# Patient Record
Sex: MALE | Race: ASIAN | NOT HISPANIC OR LATINO | Employment: FULL TIME | URBAN - METROPOLITAN AREA
[De-identification: names, ages, dates, MRNs, and addresses within clinical notes are randomized per-mention and may not be internally consistent; named-entity substitution may affect disease eponyms.]

---

## 2017-01-11 ENCOUNTER — OFFICE VISIT (OUTPATIENT)
Dept: PSYCHIATRY | Facility: HOSPITAL | Age: 37
End: 2017-01-11

## 2017-01-11 DIAGNOSIS — F43.23 ADJUSTMENT DISORDER WITH MIXED ANXIETY AND DEPRESSED MOOD: Primary | ICD-10-CM

## 2017-01-11 PROCEDURE — 90834 PSYTX W PT 45 MINUTES: CPT | Performed by: SOCIAL WORKER

## 2017-01-11 NOTE — PROGRESS NOTES
Met with client for session from 10:30-11:15. Reviewed and signed treatment plan. Still feeling very positive about the changes he has made in the marriage and in parenting. Spoke about his mother's visit the past week and that he really enjoyed having her around. Spoke about some difficult incidents in parenting and changes that he is wanting to work toward. Discussed being easy on himself and understanding that he is doing the best he can do. Will see in two weeks.

## 2017-01-25 ENCOUNTER — OFFICE VISIT (OUTPATIENT)
Dept: PSYCHIATRY | Facility: HOSPITAL | Age: 37
End: 2017-01-25

## 2017-01-25 DIAGNOSIS — F43.23 ADJUSTMENT DISORDER WITH MIXED ANXIETY AND DEPRESSED MOOD: Primary | ICD-10-CM

## 2017-01-25 PROCEDURE — 90834 PSYTX W PT 45 MINUTES: CPT | Performed by: SOCIAL WORKER

## 2017-01-25 NOTE — PROGRESS NOTES
Met with client for session from 10:30-11:15. Spoke about the past two weeks and reports that there have been ups and downs. Still working on bettering his marriage and the things he does day to day. Is doing well coming up with solutions to issues rather than just presenting problems to his wife. Seems to be much more comfortable bringing up issues with his wife and attempting to work together and compromise. She is out of town a lot the next two weeks and the client is spending a lot of alone time with his son. Says this is going well and he seems to be enjoying this time. Will see in two weeks.

## 2017-02-08 ENCOUNTER — OFFICE VISIT (OUTPATIENT)
Dept: PSYCHIATRY | Facility: HOSPITAL | Age: 37
End: 2017-02-08

## 2017-02-08 DIAGNOSIS — F43.23 ADJUSTMENT DISORDER WITH MIXED ANXIETY AND DEPRESSED MOOD: Primary | ICD-10-CM

## 2017-02-08 PROCEDURE — 90834 PSYTX W PT 45 MINUTES: CPT | Performed by: SOCIAL WORKER

## 2017-02-08 NOTE — PROGRESS NOTES
Met with client for session from 2:00-2:45. Spoke about the past two weeks and some stressors that came up. Jeanie has been travelling for work some so he is doing a lot of parenting by himself and has a learned a lot about choosing battles and where to pinpoint his energy. He and Jeanie are still doing well with communication and in therapy. Still working on money and budgeting. Had some issues with the dog costing money and are talking about what to do with the tax refund. Will see in two weeks.

## 2017-02-22 ENCOUNTER — OFFICE VISIT (OUTPATIENT)
Dept: PSYCHIATRY | Facility: HOSPITAL | Age: 37
End: 2017-02-22

## 2017-02-22 DIAGNOSIS — F43.23 ADJUSTMENT DISORDER WITH MIXED ANXIETY AND DEPRESSED MOOD: Primary | ICD-10-CM

## 2017-02-22 PROCEDURE — 90834 PSYTX W PT 45 MINUTES: CPT | Performed by: SOCIAL WORKER

## 2017-02-22 NOTE — PROGRESS NOTES
Met with client for session from 8:00-8:45. Reported that the positive changes he has made in his marriage still continue and he is satisfied with his progress. Brought up some concerns he is having with his wife and some decisions she is making. Spoke about ways to talk about this and explored what some underlying issues are related to this issue. Encouraged him to communicate the concerns and the impact they are having on him and the family. Agreed to do so and will see in 3 weeks.

## 2017-03-15 ENCOUNTER — OFFICE VISIT (OUTPATIENT)
Dept: PSYCHIATRY | Facility: HOSPITAL | Age: 37
End: 2017-03-15

## 2017-03-15 DIAGNOSIS — F43.23 ADJUSTMENT DISORDER WITH MIXED ANXIETY AND DEPRESSED MOOD: Primary | ICD-10-CM

## 2017-03-15 PROCEDURE — 90834 PSYTX W PT 45 MINUTES: CPT | Performed by: SOCIAL WORKER

## 2017-03-15 NOTE — PROGRESS NOTES
Met with client for session from 3:00-3:53. Spoke about the past three weeks and reported that things continue to go really well in the marriage and with his goals. Reported that there is still some conflict with his wife over issues and the they cannot seem to work them out. Is worried that this will not be worked out but will still talk about it in couples therapy. Agreed to continue to work on communication. Both are focused on weight watchers and thinks this is a good thing to focus on at this time. Will see in five weeks.

## 2017-04-12 ENCOUNTER — APPOINTMENT (OUTPATIENT)
Dept: PSYCHIATRY | Facility: HOSPITAL | Age: 37
End: 2017-04-12

## 2017-04-12 ENCOUNTER — OFFICE VISIT (OUTPATIENT)
Dept: PSYCHIATRY | Facility: HOSPITAL | Age: 37
End: 2017-04-12

## 2017-04-12 DIAGNOSIS — F43.23 ADJUSTMENT DISORDER WITH MIXED ANXIETY AND DEPRESSED MOOD: Primary | ICD-10-CM

## 2017-04-12 PROCEDURE — 90834 PSYTX W PT 45 MINUTES: CPT | Performed by: SOCIAL WORKER

## 2017-04-12 NOTE — PROGRESS NOTES
Met with client for session from 3:00-3:45. Spoke about his trip for the anniversary and it did not go as well as he hoped it would. Is frustrated with his wife but also acknowledges some acceptance in terms of the relationship and that he needs to deal with these intense feelings. Is also worried about parenting and his frustration there. Provided a lot of normalization in terms of Oziel's age and accepted behaviors. Understood and will return next month.

## 2017-05-10 ENCOUNTER — OFFICE VISIT (OUTPATIENT)
Dept: PSYCHIATRY | Facility: HOSPITAL | Age: 37
End: 2017-05-10

## 2017-05-10 DIAGNOSIS — F43.23 ADJUSTMENT DISORDER WITH MIXED ANXIETY AND DEPRESSED MOOD: Primary | ICD-10-CM

## 2017-05-10 PROCEDURE — 90834 PSYTX W PT 45 MINUTES: CPT | Performed by: SOCIAL WORKER

## 2017-06-07 ENCOUNTER — OFFICE VISIT (OUTPATIENT)
Dept: PSYCHIATRY | Facility: HOSPITAL | Age: 37
End: 2017-06-07

## 2017-06-07 DIAGNOSIS — F43.23 ADJUSTMENT DISORDER WITH MIXED ANXIETY AND DEPRESSED MOOD: Primary | ICD-10-CM

## 2017-06-07 PROCEDURE — 90834 PSYTX W PT 45 MINUTES: CPT | Performed by: SOCIAL WORKER

## 2017-06-07 NOTE — PROGRESS NOTES
Met with client for session from 3:00-3:45. Spoke about the past few weeks and had a stretch of time where his anxiety was increased. Definitely recognizes a difference in his ability to manage stress changes based on how he is treating his body. His wife is pregnant and we discussed his reaction to it. Acknowledges that his exictement level is very different than that of his wife. Will continue on with therapy via [facility].

## 2017-10-23 ENCOUNTER — OFFICE VISIT (OUTPATIENT)
Dept: FAMILY MEDICINE CLINIC | Facility: CLINIC | Age: 37
End: 2017-10-23

## 2017-10-23 VITALS
HEIGHT: 67 IN | TEMPERATURE: 97.5 F | HEART RATE: 62 BPM | WEIGHT: 169.2 LBS | BODY MASS INDEX: 26.56 KG/M2 | OXYGEN SATURATION: 98 % | DIASTOLIC BLOOD PRESSURE: 62 MMHG | SYSTOLIC BLOOD PRESSURE: 118 MMHG

## 2017-10-23 DIAGNOSIS — J40 BRONCHITIS: Primary | ICD-10-CM

## 2017-10-23 PROCEDURE — 99213 OFFICE O/P EST LOW 20 MIN: CPT | Performed by: FAMILY MEDICINE

## 2017-10-23 NOTE — PROGRESS NOTES
"Genesis Fernandez is a 37 y.o. male.     Chief Complaint   Patient presents with   • Cough     x 5 wks, not better was seen at the [facility] and still has a cough        History of Present Illness    Cough.  5 weeks.  Started with some cold symptoms.  Wife and son a been sick.  He went to [place] last week.  Was able to exercise mostly.  The cough is getting better.  Nonproductive.  Dry.  No wheezing.  He did have some trouble exercising couple weeks ago because of the coughing.  He went to a urgent care center.  Was given a prescription for a Z-Gaetano.  Seen by a nurse practitioner.  Otherwise doing well today.  No fever.  Does not smoke tobacco.      The following portions of the patient's history were reviewed and updated as appropriate: allergies, current medications, past family history, past medical history, past social history, past surgical history and problem list.          Review of Systems   Constitutional: Negative for fever.   HENT: Negative.    Respiratory: Positive for cough. Negative for shortness of breath and wheezing.    Cardiovascular: Negative.    Gastrointestinal: Negative.        Objective   Blood pressure 118/62, pulse 62, temperature 97.5 °F (36.4 °C), temperature source Oral, height 67\" (170.2 cm), weight 169 lb 3.2 oz (76.7 kg), SpO2 98 %.  Physical Exam   Constitutional: No distress.   No acute distress.  Nontoxic.   HENT:   Right Ear: Tympanic membrane, external ear and ear canal normal.   Left Ear: Tympanic membrane, external ear and ear canal normal.   Nose: Nose normal.   Mouth/Throat: Oropharynx is clear and moist. No oropharyngeal exudate.   Eyes: Conjunctivae are normal. Right eye exhibits no discharge. Left eye exhibits no discharge. No scleral icterus.   Cardiovascular: Normal rate.    Pulmonary/Chest: Effort normal and breath sounds normal. No stridor. No respiratory distress. He has no wheezes. He has no rales.   No tachypnea   Lymphadenopathy:     He has no cervical " adenopathy.   Skin: No rash noted.   Nursing note and vitals reviewed.      Assessment/Plan   Johnson was seen today for cough.    Diagnoses and all orders for this visit:    Bronchitis       Viral bronchitis.  Resolving.  At this time I recommend observation.  Continue over-the-counter cough drops.  If not improved in 2 weeks would recommend chest x-ray.  He will call if not improved.

## 2018-09-07 ENCOUNTER — OFFICE VISIT (OUTPATIENT)
Dept: FAMILY MEDICINE CLINIC | Facility: CLINIC | Age: 38
End: 2018-09-07

## 2018-09-07 VITALS
DIASTOLIC BLOOD PRESSURE: 76 MMHG | TEMPERATURE: 97.1 F | BODY MASS INDEX: 26.38 KG/M2 | OXYGEN SATURATION: 99 % | WEIGHT: 168.1 LBS | SYSTOLIC BLOOD PRESSURE: 125 MMHG | HEART RATE: 66 BPM | HEIGHT: 67 IN

## 2018-09-07 DIAGNOSIS — S62.639D CLOSED FRACTURE OF TUFT OF DISTAL PHALANX OF FINGER WITH ROUTINE HEALING: Primary | ICD-10-CM

## 2018-09-07 PROCEDURE — 99213 OFFICE O/P EST LOW 20 MIN: CPT | Performed by: FAMILY MEDICINE

## 2018-09-07 PROCEDURE — 73140 X-RAY EXAM OF FINGER(S): CPT | Performed by: FAMILY MEDICINE

## 2018-09-07 NOTE — PROGRESS NOTES
"Genesis Fernandez is a 38 y.o. male.     Chief Complaint   Patient presents with   • Finger Injury     follow up broken left  4th digit on aug 5th after dropping a weight on it at the gym        History of Present Illness    One-month follow-up.  He was at the urgent care center just over 4 weeks ago.  Left fourth finger tuft fracture.  He dropped some weights on it.  He's had it immobilized since.  He complains of minimal discomfort.  He's able to move the finger now.  The bruising is gone.  No numbness.  No weakness.  He had severe pain then, but now the pain is all but gone.      The following portions of the patient's history were reviewed and updated as appropriate: allergies, current medications, past family history, past medical history, past social history, past surgical history and problem list.          Review of Systems   Constitutional: Negative.    Musculoskeletal: Negative for arthralgias and joint swelling.   Skin: Negative for rash and wound.   Neurological: Negative for weakness and numbness.       Objective   Blood pressure 125/76, pulse 66, temperature 97.1 °F (36.2 °C), temperature source Oral, height 170.2 cm (67.01\"), weight 76.2 kg (168 lb 1.6 oz), SpO2 99 %.  Physical Exam   Constitutional: No distress.   Musculoskeletal:   Examination left fourth finger reveals full range of motion of all joints.  There is minimal ecchymosis under the distal nail.  He has intact neurovascular tendon status.  In particular he can maintain active DIP extension against resistance, similar with flexion.  No significant pain to palpation.   Skin: He is not diaphoretic.       X-ray left fourth finger.  Indication follow-up.  Compared to previous films one month ago.  I reviewed the old films.  It demonstrated a Tuft fracture longitudinally.  Only seen on AP view.  X-ray today reveal a comminuted tuft fracture that appears to be healing.  There is questionable malunion.  Final report " pending.      Assessment/Plan   Johnson was seen today for finger injury.    Diagnoses and all orders for this visit:    Closed fracture of tuft of distal phalanx of finger with routine healing  -     Ambulatory Referral to Hand Surgery      Closed tuft fracture of distal left finger.  Likely healing.  Clinically the exam is much improved but the x-ray shows questionable malunion.  I'm recommending referral to hand surgery for an opinion.  Referral has been placed.  I recommend he continue to keep it wrapped for the next few days until he seen by the hand doctor.

## 2018-09-10 NOTE — PROGRESS NOTES
The final xray report showed that the fracture is potentially healing too slowly. Keep appointment with hand surgeon.

## 2019-12-12 ENCOUNTER — OFFICE VISIT (OUTPATIENT)
Dept: FAMILY MEDICINE CLINIC | Facility: CLINIC | Age: 39
End: 2019-12-12

## 2019-12-12 VITALS
BODY MASS INDEX: 26.42 KG/M2 | HEART RATE: 64 BPM | WEIGHT: 168.3 LBS | DIASTOLIC BLOOD PRESSURE: 74 MMHG | OXYGEN SATURATION: 98 % | HEIGHT: 67 IN | TEMPERATURE: 97.3 F | SYSTOLIC BLOOD PRESSURE: 118 MMHG

## 2019-12-12 DIAGNOSIS — S76.302A LEFT HAMSTRING INJURY, INITIAL ENCOUNTER: ICD-10-CM

## 2019-12-12 DIAGNOSIS — Z00.00 HEALTH CARE MAINTENANCE: Primary | ICD-10-CM

## 2019-12-12 LAB
ALBUMIN SERPL-MCNC: 5.2 G/DL (ref 3.5–5.2)
ALBUMIN/GLOB SERPL: 1.9 G/DL
ALP SERPL-CCNC: 104 U/L (ref 39–117)
ALT SERPL-CCNC: 41 U/L (ref 1–41)
APPEARANCE UR: CLEAR
AST SERPL-CCNC: 26 U/L (ref 1–40)
BASOPHILS # BLD AUTO: 0.03 10*3/MM3 (ref 0–0.2)
BASOPHILS NFR BLD AUTO: 0.4 % (ref 0–1.5)
BILIRUB SERPL-MCNC: 1.7 MG/DL (ref 0.2–1.2)
BILIRUB UR QL STRIP: NEGATIVE
BUN SERPL-MCNC: 13 MG/DL (ref 6–20)
BUN/CREAT SERPL: 12.5 (ref 7–25)
CALCIUM SERPL-MCNC: 9.6 MG/DL (ref 8.6–10.5)
CHLORIDE SERPL-SCNC: 100 MMOL/L (ref 98–107)
CHOLEST SERPL-MCNC: 217 MG/DL (ref 0–200)
CO2 SERPL-SCNC: 26.8 MMOL/L (ref 22–29)
COLOR UR: YELLOW
CREAT SERPL-MCNC: 1.04 MG/DL (ref 0.76–1.27)
EOSINOPHIL # BLD AUTO: 0.45 10*3/MM3 (ref 0–0.4)
EOSINOPHIL NFR BLD AUTO: 6.3 % (ref 0.3–6.2)
ERYTHROCYTE [DISTWIDTH] IN BLOOD BY AUTOMATED COUNT: 12.9 % (ref 12.3–15.4)
GLOBULIN SER CALC-MCNC: 2.8 GM/DL
GLUCOSE SERPL-MCNC: 95 MG/DL (ref 65–99)
GLUCOSE UR QL: NEGATIVE
HCT VFR BLD AUTO: 48.9 % (ref 37.5–51)
HDLC SERPL-MCNC: 64 MG/DL (ref 40–60)
HGB BLD-MCNC: 16.3 G/DL (ref 13–17.7)
HGB UR QL STRIP: NEGATIVE
IMM GRANULOCYTES # BLD AUTO: 0.03 10*3/MM3 (ref 0–0.05)
IMM GRANULOCYTES NFR BLD AUTO: 0.4 % (ref 0–0.5)
KETONES UR QL STRIP: NEGATIVE
LDLC SERPL CALC-MCNC: 133 MG/DL (ref 0–100)
LEUKOCYTE ESTERASE UR QL STRIP: NEGATIVE
LYMPHOCYTES # BLD AUTO: 1.82 10*3/MM3 (ref 0.7–3.1)
LYMPHOCYTES NFR BLD AUTO: 25.5 % (ref 19.6–45.3)
MCH RBC QN AUTO: 29.2 PG (ref 26.6–33)
MCHC RBC AUTO-ENTMCNC: 33.3 G/DL (ref 31.5–35.7)
MCV RBC AUTO: 87.6 FL (ref 79–97)
MONOCYTES # BLD AUTO: 0.51 10*3/MM3 (ref 0.1–0.9)
MONOCYTES NFR BLD AUTO: 7.1 % (ref 5–12)
NEUTROPHILS # BLD AUTO: 4.31 10*3/MM3 (ref 1.7–7)
NEUTROPHILS NFR BLD AUTO: 60.3 % (ref 42.7–76)
NITRITE UR QL STRIP: NEGATIVE
NRBC BLD AUTO-RTO: 0 /100 WBC (ref 0–0.2)
PH UR STRIP: 5.5 [PH] (ref 5–8)
PLATELET # BLD AUTO: 249 10*3/MM3 (ref 140–450)
POTASSIUM SERPL-SCNC: 4.4 MMOL/L (ref 3.5–5.2)
PROT SERPL-MCNC: 8 G/DL (ref 6–8.5)
PROT UR QL STRIP: NEGATIVE
RBC # BLD AUTO: 5.58 10*6/MM3 (ref 4.14–5.8)
SODIUM SERPL-SCNC: 139 MMOL/L (ref 136–145)
SP GR UR: 1.02 (ref 1–1.03)
TRIGL SERPL-MCNC: 102 MG/DL (ref 0–150)
UROBILINOGEN UR STRIP-MCNC: NORMAL MG/DL
VLDLC SERPL CALC-MCNC: 20.4 MG/DL
WBC # BLD AUTO: 7.15 10*3/MM3 (ref 3.4–10.8)

## 2019-12-12 PROCEDURE — 99395 PREV VISIT EST AGE 18-39: CPT | Performed by: FAMILY MEDICINE

## 2019-12-12 NOTE — PROGRESS NOTES
Genesis Fernandez is a 39 y.o. male.     Chief Complaint   Patient presents with   • Annual Exam     pt is fasting    • Leg Pain     left leg pain from a fall at [facility]    • Chest Pain     right upper chest, rib pain from a fall at [facility]         History of Present Illness    Here for annual wellness visit.  He exercises very regularly, with exception of the injury, see below.  Does not smoke tobacco.  Occasional marijuana use.  He will have a couple of beers on the weekends watching football with friends.  Otherwise no routine alcohol use.  No complaints about his health other than the thigh pain and some chest wall discomfort.  Both getting better.    Around [facility] the patient was running up a hill with his children.  He had a sudden onset of a popping sensation in the posterior left medial hamstring near the glutes.  He had severe pain after that.  Went to urgent care center.  He also fell on his right chest.  He is here for follow-up.  The ibuprofen has been helping.  He took 2 or 3 of his wife's leftover Percocet from having a baby.  That helped him sleep.  But he has not taken in a couple of days.  The pain is getting better.  He has not been exercising since the injury.      Social History     Tobacco Use   • Smoking status: Former Smoker     Packs/day: 0.00     Years: 0.00     Pack years: 0.00   • Smokeless tobacco: Never Used   Substance Use Topics   • Alcohol use: Yes     Alcohol/week: 3.0 standard drinks     Types: 3 Cans of beer per week   • Drug use: No     Immunization History   Administered Date(s) Administered   • Tdap 2015       Family History   Problem Relation Age of Onset   • Osteopenia Mother    • Hypertension Father    • Heart attack Father          MI age 42. Smoker   • Hyperlipidemia Father              The following portions of the patient's history were reviewed and updated as appropriate: allergies, current medications, past family history, past medical  "history, past social history, past surgical history and problem list.          Review of Systems   Constitutional: Negative.    HENT: Negative.    Respiratory: Negative.    Cardiovascular: Negative.    Gastrointestinal: Negative.  Negative for blood in stool.        No dysphagia.  No severe acid reflux symptoms.   Endocrine: Negative.    Genitourinary: Negative.  Negative for hematuria.   Musculoskeletal: Negative for joint swelling.   Skin: Negative.    Neurological: Negative.    Psychiatric/Behavioral: Negative.    All other systems reviewed and are negative.      Objective   Blood pressure 118/74, pulse 64, temperature 97.3 °F (36.3 °C), temperature source Oral, height 170.2 cm (67.01\"), weight 76.3 kg (168 lb 4.8 oz), SpO2 98 %.  Physical Exam   Constitutional: He is oriented to person, place, and time. He appears well-developed and well-nourished. No distress.   HENT:   Head: Normocephalic and atraumatic.   Right Ear: Tympanic membrane, external ear and ear canal normal.   Left Ear: Tympanic membrane, external ear and ear canal normal.   Nose: Nose normal.   Mouth/Throat: Oropharynx is clear and moist. No oropharyngeal exudate.   Eyes: Pupils are equal, round, and reactive to light. Conjunctivae and EOM are normal.   Neck: Normal range of motion. Neck supple. No tracheal deviation present. No thyromegaly present.   Cardiovascular: Normal rate, regular rhythm, normal heart sounds and intact distal pulses.   No murmur heard.  Pulmonary/Chest: Effort normal and breath sounds normal.   Abdominal: Soft. Bowel sounds are normal. He exhibits no abdominal bruit and no mass. There is no hepatosplenomegaly. There is no tenderness. No hernia. Hernia confirmed negative in the right inguinal area and confirmed negative in the left inguinal area.   Genitourinary: Testes normal and penis normal. Tender:   Right testis shows no mass and no tenderness. Left testis shows no mass and no tenderness.   Musculoskeletal: Normal range " of motion.   He has pain without palpable hematoma at the proximal medial hamstring near the tendon junction.  He has some dependent ecchymosis in the popliteal fossa.  Full range of motion.  Good strength.  Gait nonantalgic.    Right chest wall.  Some tenderness over the medial pectoralis muscle.  No bony tenderness.  No crepitation.  No ecchymosis.   Lymphadenopathy:     He has no cervical adenopathy.   Neurological: He is alert and oriented to person, place, and time. He exhibits normal muscle tone.   Skin: Skin is warm. No rash noted.   Psychiatric: He has a normal mood and affect. His behavior is normal. Judgment and thought content normal.       Assessment/Plan   Johnson was seen today for annual exam, leg pain and chest pain.    Diagnoses and all orders for this visit:    Health care maintenance  -     CBC & Differential  -     Comprehensive Metabolic Panel  -     Lipid Panel  -     Urinalysis With Microscopic If Indicated (No Culture) - Urine, Clean Catch    Left hamstring injury, initial encounter  -     Ambulatory Referral to Physical Therapy Evaluate and treat      Annual wellness visit.    Immunizations.  Tdap up-to-date.    Left hamstring injury, likely partial muscular/tendon rupture.  He has some dependent ecchymosis in the left popliteal fossa.  I am recommending physical therapy.  Recommending continuing NSAIDs such as over-the-counter ibuprofen or Aleve.  I cannot recommend he continue taking his wife's Percocet.  He stopped taking a couple days ago.  If still having trouble with physical therapy, would recommend sports medicine consultation.  This time no indication for MRI.    Chest wall muscle contusion.  Healing.    Preventative health practices discussed including regular exercise.

## 2019-12-19 ENCOUNTER — TREATMENT (OUTPATIENT)
Dept: PHYSICAL THERAPY | Facility: CLINIC | Age: 39
End: 2019-12-19

## 2019-12-19 DIAGNOSIS — M79.605 LEG PAIN, POSTERIOR, LEFT: ICD-10-CM

## 2019-12-19 DIAGNOSIS — S76.302D LEFT HAMSTRING INJURY, SUBSEQUENT ENCOUNTER: Primary | ICD-10-CM

## 2019-12-19 PROCEDURE — 97035 APP MDLTY 1+ULTRASOUND EA 15: CPT | Performed by: PHYSICAL THERAPIST

## 2019-12-19 PROCEDURE — 97110 THERAPEUTIC EXERCISES: CPT | Performed by: PHYSICAL THERAPIST

## 2019-12-19 PROCEDURE — 97161 PT EVAL LOW COMPLEX 20 MIN: CPT | Performed by: PHYSICAL THERAPIST

## 2019-12-19 NOTE — PROGRESS NOTES
Physical Therapy Initial Evaluation and Plan of Care    Patient: Johnson Fernandez   : 1980  Diagnosis/ICD-10 Code:  Left hamstring injury, subsequent encounter [S76.302D]  Referring practitioner: Sanjiv Chinchilla MD  Date of Initial Visit: 2019  Today's Date: 2019    Subjective Evaluation    History of Present Illness  Mechanism of injury: Injury on Thanksgiving day, running up a hill and felt a popping sensation and pain in his left hamstring. Movement and walking were extremely painful for the first week. Reports significant improvement, can now walk without pain. Had onset of bruising in proximal calf sometime later (was not noted by TARIK SANTOS, but was present by the time he saw Dr. Chinchilla). He still has pain with stair climbing, squatting, etc. Works in [employer] for [employer], notices increased tightness when he sits for longer than an hour. Went back to the gym this week, but only for upper body workouts. Wants to get back to using stair climber (5-15 minutes), squatting, running ~1 mile as a warmup.       Patient Occupation: works in [employer] with [employer] Quality of life: good    Pain  Current pain ratin  At best pain ratin  At worst pain ratin  Location: left hamstring  Quality: sharp (intermittent, brief with certain activities)  Relieving factors: ice and heat  Aggravating factors: stairs  Progression: improved    Social Support  Lives in: multiple-level home  Lives with: spouse and young children    Diagnostic Tests  No diagnostic tests performed    Patient Goals  Patient goals for therapy: decreased pain and return to sport/leisure activities             Objective       Observations   Left Knee   Negative for atrophy, deformity and edema.     Additional Observation Details  Bruising noted proximal lateral calf, no bruising noted in thigh.    Palpation   Left   No palpable tenderness to the medial gastrocnemius, rectus femoris, vastus lateralis and vastus medialis.   Tenderness of the  distal biceps femoris, distal semimembranosus, distal semitendinosus, lateral gastrocnemius, peroneus and soleus.     Tenderness   Left Knee   Tenderness in the fibular head and ITB. No tenderness in the lateral joint line, LCL (distal), LCL (proximal), MCL (distal), MCL (proximal), medial joint line, pes anserinus, quadriceps tendon, superior patella and tibial tubercle.     Lumbar Screen  Lumbar range of motion within normal limits.    Neurological Testing     Sensation     Knee   Left Knee   Intact: light touch    Active Range of Motion   Left Knee   Flexion: 125 degrees   Extension: 0 degrees     Right Knee   Flexion: 128 degrees   Extension: 0 degrees     Patellar Static Positioning   Left Knee: WFL    Strength/Myotome Testing     Left Hip   Planes of Motion   Flexion: 5  Extension: 4  Abduction: 4  Adduction: 4  External rotation: 4  Internal rotation: 4    Right Hip   Planes of Motion   Flexion: 5  Extension: 5  Abduction: 5  Adduction: 5    Left Knee   Flexion: 4+ (does not reproduce pain)  Extension: 5    Right Knee   Flexion: 5  Extension: 5    Left Ankle/Foot   Dorsiflexion: 5  Plantar flexion: 4+  Inversion: 5  Eversion: 4- (reproduces pain)    Right Ankle/Foot   Dorsiflexion: 5  Plantar flexion: 5  Inversion: 5  Eversion: 5    Tests     Lumbar     Left   Negative passive SLR and quadrant.     Left Hip   Positive Emmy.   Negative scour.   Clement: Positive.   90/90 SLR: Positive.     Left Knee   Negative anterior Lachman, lateral Abbie, medial Abbie, posterior drawer, Thessaly's test at 5 degrees, Thessaly's test at 20 degrees, valgus stress test at 0 degrees, valgus stress test at 30 degrees, varus stress test at 0 degrees and varus stress test at 30 degrees.     Ambulation     Observational Gait   Gait: within functional limits     Functional Assessment   Squat   Pain, left tibial anterior translation beyond toes, sitting toward right side and right tibial anterior translation beyond toes.  "    Forward Step Up 8\"   Left Leg  Pain and increased contralateral push off.     Comments  Pt able to complete squat with proper form, but states it causes pain so he modifies to avoid pain.         Assessment & Plan     Assessment  Impairments: abnormal or restricted ROM, activity intolerance, impaired physical strength, lacks appropriate home exercise program and pain with function  Assessment details: Mr. Fernandez is a pleasant 39 year old male who presents with pain, decreased LLE flexibility and decreased hip/core strength following injury while running on Thanksgiving day. He will benefit from PT to reduce pain/inflammation, improve flexibility and strength, and normalize body mechanics with running, squat and deadlift so that he can return to desired exercise regimen.  Prognosis: good  Functional Limitations: uncomfortable because of pain and sitting  Goals  Plan Goals: Short Term Goals: 2-4 weeks. Patient will:  1. Be independent with initial HEP  2. Be instructed in posture and body mechanics    Long Term Goals: 4-6 weeks. Patient will:  1. Demonstrate improved Left lower extremity MMT of 5/5  2. Demonstrate lower extremity flexibility WFL.  3. Demonstrate adequate control of dynamic genu valgus with squat and single leg squat to allow lifting/squatting without increased pain.  4. Report ability to run 1 mile and return to weightlifting regimen with </= 1/10 pain.  5. Perceived disability </= 10% as measured by LEFS      Plan  Therapy options: will be seen for skilled physical therapy services  Planned modality interventions: cryotherapy, ultrasound, TENS, electrical stimulation/Russian stimulation and thermotherapy (hydrocollator packs)  Planned therapy interventions: abdominal trunk stabilization, manual therapy, neuromuscular re-education, postural training, soft tissue mobilization, spinal/joint mobilization, joint mobilization, stretching, strengthening, functional ROM exercises and " flexibility  Frequency: 2x week  Duration in weeks: 8  Treatment plan discussed with: patient        Manual Therapy:    0     mins  61457;  Therapeutic Exercise:    10     mins  58085;     Neuromuscular Tory:    0    mins  75043;    Therapeutic Activity:     0     mins  14476;     Gait Trainin     mins  20855;     Ultrasound:     8     mins  85998;    Electrical Stimulation:    0     mins  62192 ( );    Timed Treatment:   18   mins   Total Treatment:     60   mins    PT SIGNATURE: Dionna Horton PT, DPT          Physical Therapist                               [place] License #[id number]    DATE TREATMENT INITIATED: 2019    Initial Certification  Certification Period: 3/18/2020  I certify that the therapy services are furnished while this patient is under my care.  The services outlined above are required by this patient, and will be reviewed every 90 days.     PHYSICIAN: Sanjiv Chinchilla MD      DATE:     Please sign and return via fax to [phone].. Thank you, [facility].

## 2019-12-23 ENCOUNTER — TREATMENT (OUTPATIENT)
Dept: PHYSICAL THERAPY | Facility: CLINIC | Age: 39
End: 2019-12-23

## 2019-12-23 DIAGNOSIS — M79.605 LEG PAIN, POSTERIOR, LEFT: ICD-10-CM

## 2019-12-23 DIAGNOSIS — S76.302D LEFT HAMSTRING INJURY, SUBSEQUENT ENCOUNTER: Primary | ICD-10-CM

## 2019-12-23 PROCEDURE — 97035 APP MDLTY 1+ULTRASOUND EA 15: CPT | Performed by: PHYSICAL THERAPIST

## 2019-12-23 PROCEDURE — 97140 MANUAL THERAPY 1/> REGIONS: CPT | Performed by: PHYSICAL THERAPIST

## 2019-12-23 PROCEDURE — 97014 ELECTRIC STIMULATION THERAPY: CPT | Performed by: PHYSICAL THERAPIST

## 2019-12-23 NOTE — PROGRESS NOTES
Physical Therapy Daily Progress Note    Visit #2    Subjective     Johnson Fernandez reports: pain is improved, is adherent with HEP.      Objective   See Exercise, Manual, and Modality Logs for complete treatment.       Assessment/Plan  Bruising is improved, tolerated manual therapy well. Will begin strengthening exercises next visit, encouraged pt to continue stretching at home.  Progress per Plan of Care           Manual Therapy:    15     mins  16614;  Therapeutic Exercise:    0     mins  65324;     Neuromuscular Tory:    0    mins  56621;    Therapeutic Activity:     0     mins  97233;     Gait Trainin     mins  57161;     Ultrasound:     8     mins  63883;    Electrical Stimulation:    15     mins  65658 ( );    Timed Treatment:   23   mins   Total Treatment:     55   mins    Dionna Horton PT, DPT  Physical Therapist  [place] License #[id number]

## 2019-12-23 NOTE — PATIENT INSTRUCTIONS
Pt was educated regarding relevant anatomy/physiology and plan of care.      Access Code: [email or web address]   URL: [email or web address]   Date: 12/20/2019   Prepared by: Dionna Horton     Exercises   Supine Hamstring Stretch with Strap - 3 reps - 20 hold - 2x daily   Long Sitting Calf Stretch with Strap - 3 reps - 20 hold - 2x daily   Soleus Stretch on Wall - 3 reps - 20 hold - 2x daily   Supine ITB Stretch with Strap - 3 reps - 20 hold - 2x daily

## 2019-12-26 ENCOUNTER — TREATMENT (OUTPATIENT)
Dept: PHYSICAL THERAPY | Facility: CLINIC | Age: 39
End: 2019-12-26

## 2019-12-26 DIAGNOSIS — M79.605 LEG PAIN, POSTERIOR, LEFT: ICD-10-CM

## 2019-12-26 DIAGNOSIS — S76.302D LEFT HAMSTRING INJURY, SUBSEQUENT ENCOUNTER: Primary | ICD-10-CM

## 2019-12-26 PROCEDURE — 97035 APP MDLTY 1+ULTRASOUND EA 15: CPT | Performed by: PHYSICAL THERAPIST

## 2019-12-26 PROCEDURE — 97014 ELECTRIC STIMULATION THERAPY: CPT | Performed by: PHYSICAL THERAPIST

## 2019-12-26 PROCEDURE — 97110 THERAPEUTIC EXERCISES: CPT | Performed by: PHYSICAL THERAPIST

## 2019-12-26 PROCEDURE — 97140 MANUAL THERAPY 1/> REGIONS: CPT | Performed by: PHYSICAL THERAPIST

## 2019-12-26 NOTE — PROGRESS NOTES
Physical Therapy Daily Progress Note    Visit #3    Subjective     Johnson Fernandez reports: no new complaints. Manual and modalities seemed to help some last visit. Still with pain with squat.       Objective   See Exercise, Manual, and Modality Logs for complete treatment.       Assessment/Plan  Began strengthening exercises today, tolerated well without pain. Bruising nearly resolved.   Progress per Plan of Care           Manual Therapy:    12     mins  21127;  Therapeutic Exercise:    20     mins  54376;     Neuromuscular Tory:    0    mins  49773;    Therapeutic Activity:     0     mins  10010;     Gait Trainin     mins  29134;     Ultrasound:     8     mins  08811;    Electrical Stimulation:    15     mins  51080 ( );    Timed Treatment:   40   mins   Total Treatment:     55   mins    Dionna Horton PT, DPT  Physical Therapist  [place] License #[id number]

## 2019-12-31 ENCOUNTER — TREATMENT (OUTPATIENT)
Dept: PHYSICAL THERAPY | Facility: CLINIC | Age: 39
End: 2019-12-31

## 2019-12-31 DIAGNOSIS — M79.605 LEG PAIN, POSTERIOR, LEFT: ICD-10-CM

## 2019-12-31 DIAGNOSIS — S76.302D LEFT HAMSTRING INJURY, SUBSEQUENT ENCOUNTER: Primary | ICD-10-CM

## 2019-12-31 PROCEDURE — 97035 APP MDLTY 1+ULTRASOUND EA 15: CPT | Performed by: PHYSICAL THERAPIST

## 2019-12-31 PROCEDURE — 97140 MANUAL THERAPY 1/> REGIONS: CPT | Performed by: PHYSICAL THERAPIST

## 2019-12-31 PROCEDURE — 97014 ELECTRIC STIMULATION THERAPY: CPT | Performed by: PHYSICAL THERAPIST

## 2019-12-31 PROCEDURE — 97110 THERAPEUTIC EXERCISES: CPT | Performed by: PHYSICAL THERAPIST

## 2019-12-31 NOTE — PROGRESS NOTES
Physical Therapy Daily Progress Note    Visit #4    Subjective     Johnson Fernandez reports: pain is slowly decreasing. Still come discomfort with squat, but less than at last visit.      Objective   See Exercise, Manual, and Modality Logs for complete treatment.       Assessment/Plan  Less tender with palpation of hamstrings today, bruising in calf is resolved. Progressing well toward goals overall.  Progress per Plan of Care           Manual Therapy:    10     mins  06932;  Therapeutic Exercise:    35     mins  90189;     Neuromuscular Tory:    0    mins  47726;    Therapeutic Activity:     0     mins  12857;     Gait Trainin     mins  16073;     Ultrasound:     8     mins  96146;    Electrical Stimulation:    10     mins  66327 ( );    Timed Treatment:   53   mins   Total Treatment:     65   mins    Dionna Horton PT, DPT  Physical Therapist  [place] License #[id number]

## 2020-01-02 ENCOUNTER — TREATMENT (OUTPATIENT)
Dept: PHYSICAL THERAPY | Facility: CLINIC | Age: 40
End: 2020-01-02

## 2020-01-02 DIAGNOSIS — S76.302D LEFT HAMSTRING INJURY, SUBSEQUENT ENCOUNTER: Primary | ICD-10-CM

## 2020-01-02 DIAGNOSIS — M79.605 LEG PAIN, POSTERIOR, LEFT: ICD-10-CM

## 2020-01-02 PROCEDURE — 97035 APP MDLTY 1+ULTRASOUND EA 15: CPT | Performed by: PHYSICAL THERAPIST

## 2020-01-02 PROCEDURE — 97014 ELECTRIC STIMULATION THERAPY: CPT | Performed by: PHYSICAL THERAPIST

## 2020-01-02 PROCEDURE — 97140 MANUAL THERAPY 1/> REGIONS: CPT | Performed by: PHYSICAL THERAPIST

## 2020-01-02 PROCEDURE — 97110 THERAPEUTIC EXERCISES: CPT | Performed by: PHYSICAL THERAPIST

## 2020-01-02 NOTE — PROGRESS NOTES
Physical Therapy Daily Progress Note    Visit #5    Subjective     Johnson Fernandez reports: no pain with everyday mobility. Feels really good when he does stretches, is going to work on doing these more consistently.      Objective   See Exercise, Manual, and Modality Logs for complete treatment.       Assessment/Plan  Increased exercises today, pt able to perform pain free. Added banded squats to HEP. Discussed returning to easy weightlifting at the gym, with elliptical or walking for warmup (no stair climber for now). Pt is agreeable, will reduce frequency of PT to 1x weekly with increased exercise at home.  Progress per Plan of Care           Manual Therapy:    10     mins  41013;  Therapeutic Exercise:    35     mins  78188;     Neuromuscular Tory:    0    mins  04154;    Therapeutic Activity:     0     mins  24095;     Gait Trainin     mins  09223;     Ultrasound:     0     mins  39294;    Electrical Stimulation:    15     mins  85443 ( );    Timed Treatment:   45   mins   Total Treatment:     60   mins    Dionna Horton PT, DPT  Physical Therapist  [place] License #[id number]

## 2020-01-10 ENCOUNTER — TREATMENT (OUTPATIENT)
Dept: PHYSICAL THERAPY | Facility: CLINIC | Age: 40
End: 2020-01-10

## 2020-01-10 DIAGNOSIS — M79.605 LEG PAIN, POSTERIOR, LEFT: ICD-10-CM

## 2020-01-10 DIAGNOSIS — S76.302D LEFT HAMSTRING INJURY, SUBSEQUENT ENCOUNTER: Primary | ICD-10-CM

## 2020-01-10 PROCEDURE — 97110 THERAPEUTIC EXERCISES: CPT | Performed by: PHYSICAL THERAPIST

## 2020-01-10 PROCEDURE — 97140 MANUAL THERAPY 1/> REGIONS: CPT | Performed by: PHYSICAL THERAPIST

## 2020-01-10 NOTE — PROGRESS NOTES
Physical Therapy Daily Progress Note    Visit #6    Subjective     Johnson Fernandez reports: he has returned to weight lifting (low weight) at the gym, no pain.       Objective   See Exercise, Manual, and Modality Logs for complete treatment.   No soft tissue restriction noted with manual therapy.    Assessment/Plan  Pt is now able to squat with proper form without pain. Discussed returning to stair climber at the gym as desired, then able to increase weights if this is tolerated well for several days.  Progress per Plan of Care           Manual Therapy:    10     mins  24467;  Therapeutic Exercise:    25     mins  79654;     Neuromuscular Tory:    0    mins  62811;    Therapeutic Activity:     0     mins  55418;     Gait Trainin     mins  60086;     Ultrasound:     0     mins  88286;    Electrical Stimulation:    0     mins  22031 ( );    Timed Treatment:   35   mins   Total Treatment:     40   mins    Dionna Horton PT, DPT  Physical Therapist  [place] License #[id number]

## 2020-01-17 ENCOUNTER — TREATMENT (OUTPATIENT)
Dept: PHYSICAL THERAPY | Facility: CLINIC | Age: 40
End: 2020-01-17

## 2020-01-17 DIAGNOSIS — M79.605 LEG PAIN, POSTERIOR, LEFT: ICD-10-CM

## 2020-01-17 DIAGNOSIS — S76.302D LEFT HAMSTRING INJURY, SUBSEQUENT ENCOUNTER: Primary | ICD-10-CM

## 2020-01-17 PROCEDURE — 97110 THERAPEUTIC EXERCISES: CPT | Performed by: PHYSICAL THERAPIST

## 2020-01-17 NOTE — PROGRESS NOTES
Physical Therapy Daily Progress Note    Visit #7    Subjective     Johnson Fernandez reports: he has returned to the stair climber for his warm up at the gym, and has increased his weight lifting to ~80% of pre injury weights, pain free with all.       Objective   See Exercise, Manual, and Modality Logs for complete treatment.       Assessment/Plan  Pt has met most goals, anticipate will return to full workout on his own. Educated regarding long term HEP, issued black theraband loop. Will hold chart open 30 days in case of acute exacerbation, otherwise D/C.             Manual Therapy:    0     mins  83961;  Therapeutic Exercise:    40     mins  90062;     Neuromuscular Tory:    0    mins  32026;    Therapeutic Activity:     0     mins  31199;     Gait Trainin     mins  48324;     Ultrasound:     0     mins  90089;    Electrical Stimulation:    0     mins  55595 ( );    Timed Treatment:   40   mins   Total Treatment:     40   mins    Dionna Horton PT, DPT  Physical Therapist  [place] License #[id number]

## 2020-10-15 ENCOUNTER — OFFICE VISIT (OUTPATIENT)
Dept: FAMILY MEDICINE CLINIC | Facility: CLINIC | Age: 40
End: 2020-10-15

## 2020-10-15 VITALS
RESPIRATION RATE: 16 BRPM | DIASTOLIC BLOOD PRESSURE: 80 MMHG | HEART RATE: 58 BPM | HEIGHT: 67 IN | SYSTOLIC BLOOD PRESSURE: 132 MMHG | OXYGEN SATURATION: 99 % | BODY MASS INDEX: 26.65 KG/M2 | TEMPERATURE: 97.3 F | WEIGHT: 169.8 LBS

## 2020-10-15 DIAGNOSIS — Z83.3 FAMILY HISTORY OF DIABETES MELLITUS: ICD-10-CM

## 2020-10-15 DIAGNOSIS — F33.2 SEVERE EPISODE OF RECURRENT MAJOR DEPRESSIVE DISORDER, WITHOUT PSYCHOTIC FEATURES (HCC): Primary | ICD-10-CM

## 2020-10-15 DIAGNOSIS — Z12.5 SCREENING FOR MALIGNANT NEOPLASM OF PROSTATE: ICD-10-CM

## 2020-10-15 DIAGNOSIS — R45.850 HOMICIDAL IDEATION: ICD-10-CM

## 2020-10-15 DIAGNOSIS — E78.2 MIXED HYPERLIPIDEMIA: ICD-10-CM

## 2020-10-15 DIAGNOSIS — F41.9 ANXIETY: ICD-10-CM

## 2020-10-15 DIAGNOSIS — Z00.00 ANNUAL PHYSICAL EXAM: ICD-10-CM

## 2020-10-15 PROCEDURE — 99395 PREV VISIT EST AGE 18-39: CPT | Performed by: FAMILY MEDICINE

## 2020-10-15 PROCEDURE — 99214 OFFICE O/P EST MOD 30 MIN: CPT | Performed by: FAMILY MEDICINE

## 2020-10-15 RX ORDER — SERTRALINE HYDROCHLORIDE 25 MG/1
25 TABLET, FILM COATED ORAL DAILY
Qty: 30 TABLET | Refills: 1 | Status: SHIPPED | OUTPATIENT
Start: 2020-10-15 | End: 2021-07-07

## 2020-10-15 NOTE — PROGRESS NOTES
"Subjective   Johnson Fernandez is a 40 y.o. male.     Chief Complaint   Patient presents with   • Establish Care   • Anxiety       History of Present Illness   Johnson presents as a new patient to establish care with complaint of anxiety..  He scored 19 on SAYDA 7 screening today- performed by MA.  He complains that his anxiety has \"taken off\" more than usual since covid pandemic began. He reports history of depression and anxiety \"for years, decades\". He has never taken medication  He went to therapist previously prior to covid once a month, pretty regularly since 2016. He was not comfortable with televisit or video visit which is what his therapist requires since pandemic, so he has not been speaking with a therapist since April or March.    His wife noticed he is overwhelmed, anxious, depressed, wishing his children were not alive- she is the reason he is here today. They have a 4 y/o and a 21 month old child.  He admits to thoughts of harming his 21 month old child but does not have a plan.  The 21 month old keeps him awake at night- the child has not slept much at night in over a year, so patient is not able to sleep well at all.  He also has thoughts of harming himself but does not have a plan.  There is no gun in the house.  He complains of having extreme difficulty controlling his anger.   He denies incidents of domestic violence in his home.    He stopped working out in March. He would normally exercise regularly- running, lifting weights, but does not feel comfortable going to the gym now. He is willing to start working out at home and agrees to start this week. He knows that regular exercise is very helpful for his mental health.    His family got a dog last week of March- named [email or web address]- a [employer] [place]- and the dog has been helpful in calming patient's nerves and reducing stress.   His wife is working from home also. 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    He does not smoke cigarettes.  He drinks alcohol on occasion- one beer this week, during football season 3-4 beers during a game.   Denies illicit drug use.      Past Medical History:   Diagnosis Date   • Anxiety      History reviewed. No pertinent surgical history.  Social History     Tobacco Use   • Smoking status: Former Smoker     Packs/day: 0.00     Years: 0.00     Pack years: 0.00   • Smokeless tobacco: Never Used   Substance Use Topics   • Alcohol use: Yes     Alcohol/week: 3.0 standard drinks     Types: 3 Cans of beer per week   • Drug use: No     Family History   Problem Relation Age of Onset   • Osteopenia Mother    • Hypertension Father    • Heart attack Father          MI age 42. Smoker   • Hyperlipidemia Father        Review of Systems   Constitutional: Positive for activity change (decreased physical activity). Negative for appetite change, fatigue, fever, unexpected weight gain and unexpected weight loss.   HENT: Negative for congestion.    Respiratory: Negative for cough, chest tightness and shortness of breath.    Cardiovascular: Negative for chest pain, palpitations and leg swelling.   Gastrointestinal: Negative for abdominal pain, diarrhea, nausea and vomiting.   Endocrine: Negative for polydipsia and polyuria.   Genitourinary: Negative for difficulty urinating.   Musculoskeletal: Negative for back pain.   Neurological: Negative for headache and confusion.   Psychiatric/Behavioral: Positive for agitation, behavioral problems, decreased concentration, sleep disturbance, suicidal ideas, depressed mood and stress. Negative for hallucinations, self-injury and negative for hyperactivity. The patient is nervous/anxious.        Objective   /80   Pulse 58   Temp 97.3 °F (36.3 °C)   Resp 16   Ht 170.2 cm (67\")   Wt 77 kg (169 lb 12.8 oz)   SpO2 99%   BMI 26.59 kg/m²     Physical Exam  Vitals signs reviewed.   Constitutional:       General: Distressed: emotional " distress.      Appearance: Normal appearance. He is well-developed and normal weight. He is not ill-appearing or diaphoretic.      Comments: Pleasant male, in emotional distress.   HENT:      Head: Normocephalic.      Right Ear: External ear normal.      Left Ear: External ear normal.      Nose: Nose normal.      Mouth/Throat:      Mouth: Mucous membranes are moist.      Pharynx: Oropharynx is clear.   Eyes:      Conjunctiva/sclera: Conjunctivae normal.      Pupils: Pupils are equal, round, and reactive to light.   Neck:      Musculoskeletal: Normal range of motion and neck supple.   Cardiovascular:      Rate and Rhythm: Normal rate and regular rhythm.      Pulses: Normal pulses.      Heart sounds: Normal heart sounds.   Pulmonary:      Effort: Pulmonary effort is normal. No respiratory distress.      Breath sounds: Normal breath sounds. No wheezing, rhonchi or rales.   Lymphadenopathy:      Cervical: No cervical adenopathy.   Skin:     General: Skin is warm and dry.   Neurological:      Mental Status: He is alert.   Psychiatric:         Attention and Perception: Attention normal.         Mood and Affect: Mood is anxious and depressed. Affect is tearful.         Speech: Speech is delayed.         Behavior: Behavior is slowed. Behavior is cooperative.         Thought Content: Thought content does not include homicidal or suicidal plan.         Procedures    Assessment/Plan   Diagnoses and all orders for this visit:    1. Severe episode of recurrent major depressive disorder, without psychotic features (CMS/HCC) (Primary)  -     sertraline (Zoloft) 25 MG tablet; Take 1 tablet by mouth Daily.  Dispense: 30 tablet; Refill: 1  -     TSH Rfx On Abnormal To Free T4  -     Ambulatory Referral to Psychiatry    2. Anxiety  -     sertraline (Zoloft) 25 MG tablet; Take 1 tablet by mouth Daily.  Dispense: 30 tablet; Refill: 1  -     TSH Rfx On Abnormal To Free T4    3. Annual physical exam  -     Comprehensive Metabolic Panel  -      CBC & Differential    4. Mixed hyperlipidemia  -     Lipid Panel    5. Family history of diabetes mellitus  -     Hemoglobin A1c    6. Screening for malignant neoplasm of prostate  -     PSA Screen    7. Homicidal ideation  -     Ambulatory Referral to Psychiatry    Johnson is a pleasant 39 y/o M with severe MDD, anxiety, and difficulty controlling his anger which have significantly worsened since March (beginning of pandemic). He is having both homicidal and suicidal ideation with no plan.  Spoke with patient at length regarding his mental status- explained that he likely will need medication indefinitely- that this is perfectly normal and many people need medication for depression just like diabetics need their insulin. He is agreeable to this and does appear to be goal oriented and willing to take appropriate steps to get better.  Zoloft prescribed- 25 mg/day- discussed potential side effects.  Urgent referral placed for psychiatrist.  Recommended contacting therapists to see who will agree to see him in person.  Recommended exercising with on demand work outs at home- suggested [employer] or [employer]- he is willing to do this, and to start running again.  Discussed methods to help control his anger- recommended breathing exercises and physical exercise- he is willing to try this.  Will plan to see him in 1 week for close follow up. Instructed him to call the office immediately if his symptoms worsen- will recommend he go to ER.        Patient Instructions    Zoloft prescribed- 25 mg/day  Referral placed for psychiatrist- office will call you to schedule.  Recommend contacting therapists to see who will see you in person.  Start exercising with on demand work outs at home- I suggest [employer] or [employer].  Return to clinic in 1 week for follow up.

## 2020-10-15 NOTE — PATIENT INSTRUCTIONS
Patient Instructions    Zoloft prescribed- 25 mg/day  Referral placed for psychiatrist- office will call you to schedule.  Recommend contacting therapists to see who will see you in person.  Start exercising with on demand work outs at home- I suggest [employer] or [employer].  Return to clinic in 1 week for follow up.

## 2020-10-16 LAB
ALBUMIN SERPL-MCNC: 5 G/DL (ref 3.5–5.2)
ALBUMIN/GLOB SERPL: 1.9 G/DL
ALP SERPL-CCNC: 97 U/L (ref 39–117)
ALT SERPL-CCNC: 32 U/L (ref 1–41)
AST SERPL-CCNC: 22 U/L (ref 1–40)
BASOPHILS # BLD AUTO: 0.01 10*3/MM3 (ref 0–0.2)
BASOPHILS NFR BLD AUTO: 0.1 % (ref 0–1.5)
BILIRUB SERPL-MCNC: 1.4 MG/DL (ref 0–1.2)
BUN SERPL-MCNC: 11 MG/DL (ref 6–20)
BUN/CREAT SERPL: 10.2 (ref 7–25)
CALCIUM SERPL-MCNC: 9.5 MG/DL (ref 8.6–10.5)
CHLORIDE SERPL-SCNC: 104 MMOL/L (ref 98–107)
CHOLEST SERPL-MCNC: 218 MG/DL (ref 0–200)
CO2 SERPL-SCNC: 27 MMOL/L (ref 22–29)
CREAT SERPL-MCNC: 1.08 MG/DL (ref 0.76–1.27)
EOSINOPHIL # BLD AUTO: 0.46 10*3/MM3 (ref 0–0.4)
EOSINOPHIL NFR BLD AUTO: 6.1 % (ref 0.3–6.2)
ERYTHROCYTE [DISTWIDTH] IN BLOOD BY AUTOMATED COUNT: 12.7 % (ref 12.3–15.4)
GLOBULIN SER CALC-MCNC: 2.7 GM/DL
GLUCOSE SERPL-MCNC: 95 MG/DL (ref 65–99)
HBA1C MFR BLD: 5.4 % (ref 4.8–5.6)
HCT VFR BLD AUTO: 48.7 % (ref 37.5–51)
HDLC SERPL-MCNC: 65 MG/DL (ref 40–60)
HGB BLD-MCNC: 15.6 G/DL (ref 13–17.7)
IMM GRANULOCYTES # BLD AUTO: 0.02 10*3/MM3 (ref 0–0.05)
IMM GRANULOCYTES NFR BLD AUTO: 0.3 % (ref 0–0.5)
LDLC SERPL CALC-MCNC: 138 MG/DL (ref 0–100)
LYMPHOCYTES # BLD AUTO: 1.67 10*3/MM3 (ref 0.7–3.1)
LYMPHOCYTES NFR BLD AUTO: 22.1 % (ref 19.6–45.3)
MCH RBC QN AUTO: 28.2 PG (ref 26.6–33)
MCHC RBC AUTO-ENTMCNC: 32 G/DL (ref 31.5–35.7)
MCV RBC AUTO: 87.9 FL (ref 79–97)
MONOCYTES # BLD AUTO: 0.6 10*3/MM3 (ref 0.1–0.9)
MONOCYTES NFR BLD AUTO: 7.9 % (ref 5–12)
NEUTROPHILS # BLD AUTO: 4.79 10*3/MM3 (ref 1.7–7)
NEUTROPHILS NFR BLD AUTO: 63.5 % (ref 42.7–76)
NRBC BLD AUTO-RTO: 0 /100 WBC (ref 0–0.2)
PLATELET # BLD AUTO: 247 10*3/MM3 (ref 140–450)
POTASSIUM SERPL-SCNC: 4.5 MMOL/L (ref 3.5–5.2)
PROT SERPL-MCNC: 7.7 G/DL (ref 6–8.5)
PSA SERPL-MCNC: 0.74 NG/ML (ref 0–4)
RBC # BLD AUTO: 5.54 10*6/MM3 (ref 4.14–5.8)
SODIUM SERPL-SCNC: 141 MMOL/L (ref 136–145)
TRIGL SERPL-MCNC: 86 MG/DL (ref 0–150)
TSH SERPL DL<=0.005 MIU/L-ACNC: 0.68 UIU/ML (ref 0.27–4.2)
VLDLC SERPL CALC-MCNC: 15 MG/DL (ref 5–40)
WBC # BLD AUTO: 7.55 10*3/MM3 (ref 3.4–10.8)

## 2020-10-22 ENCOUNTER — OFFICE VISIT (OUTPATIENT)
Dept: FAMILY MEDICINE CLINIC | Facility: CLINIC | Age: 40
End: 2020-10-22

## 2020-10-22 VITALS
DIASTOLIC BLOOD PRESSURE: 68 MMHG | WEIGHT: 169.7 LBS | TEMPERATURE: 97.5 F | BODY MASS INDEX: 26.63 KG/M2 | OXYGEN SATURATION: 99 % | HEART RATE: 67 BPM | RESPIRATION RATE: 14 BRPM | HEIGHT: 67 IN | SYSTOLIC BLOOD PRESSURE: 124 MMHG

## 2020-10-22 DIAGNOSIS — F41.9 ANXIETY: ICD-10-CM

## 2020-10-22 DIAGNOSIS — F33.2 SEVERE EPISODE OF RECURRENT MAJOR DEPRESSIVE DISORDER, WITHOUT PSYCHOTIC FEATURES (HCC): Primary | ICD-10-CM

## 2020-10-22 PROCEDURE — 99213 OFFICE O/P EST LOW 20 MIN: CPT | Performed by: FAMILY MEDICINE

## 2020-10-22 NOTE — PROGRESS NOTES
Subjective   Johnson Fernandez is a 40 y.o. male.     Chief Complaint   Patient presents with   • Anxiety     1 wk f/u       History of Present Illness   One week follow up for severe MDD and anxiety with SI/HI.   Johnson is doing better since last week.   He started zoloft last week- taking as directed, at night. He thinks he is having side effect of diarrhea, dry mouth, and appetite somewhat decreased. Diarrhea was 3-4x per day, but now is 1-2x per day. No fevers, abdominal pain or blood in stool.  He has apt with psychiatrist in December.  He is using EAP to see a therapist next Monday.   He started exercising again two days ago. Plans to exercise 4 days per week. Plans to incorporate yoga.   He is also drinking less alcohol than he had been during pandemic- was drinking one alcoholic drink per day but now is drinking less.   His mood has improved, less sadness, less angry outbursts. He had one tearful episode. He used breathing techniques as suggested when he feels angry- has been helpful. He feels good about having a plan and being proactive about his depression.   SI/HI have resolved.     Past Medical History:   Diagnosis Date   • Anxiety      History reviewed. No pertinent surgical history.  Social History     Tobacco Use   • Smoking status: Former Smoker     Packs/day: 0.00     Years: 0.00     Pack years: 0.00   • Smokeless tobacco: Never Used   Substance Use Topics   • Alcohol use: Yes     Alcohol/week: 3.0 standard drinks     Types: 3 Cans of beer per week   • Drug use: No     Family History   Problem Relation Age of Onset   • Osteopenia Mother    • Hypertension Father    • Heart attack Father          MI age 42. Smoker   • Hyperlipidemia Father        Review of Systems   Constitutional: Positive for appetite change. Negative for fatigue, fever, unexpected weight gain and unexpected weight loss.   Respiratory: Negative for cough, chest tightness and shortness of breath.    Cardiovascular: Negative for  "chest pain, palpitations and leg swelling.   Gastrointestinal: Positive for diarrhea. Negative for abdominal pain, blood in stool, nausea and vomiting.   Psychiatric/Behavioral: Positive for depressed mood (improved). The patient is nervous/anxious (improved).        Objective   /68 (BP Location: Left arm, Patient Position: Sitting, Cuff Size: Adult)   Pulse 67   Temp 97.5 °F (36.4 °C) (Temporal)   Resp 14   Ht 170.2 cm (67\")   Wt 77 kg (169 lb 11.2 oz)   SpO2 99%   BMI 26.58 kg/m²     Physical Exam  Vitals signs reviewed.   Constitutional:       General: He is not in acute distress.     Appearance: Normal appearance. He is well-developed and normal weight. He is not ill-appearing.   HENT:      Head: Normocephalic and atraumatic.      Right Ear: External ear normal.      Left Ear: External ear normal.   Eyes:      Conjunctiva/sclera: Conjunctivae normal.   Neck:      Musculoskeletal: Normal range of motion and neck supple.   Cardiovascular:      Rate and Rhythm: Normal rate and regular rhythm.      Pulses: Normal pulses.      Heart sounds: Normal heart sounds.   Pulmonary:      Effort: Pulmonary effort is normal. No respiratory distress.      Breath sounds: Normal breath sounds. No wheezing, rhonchi or rales.   Musculoskeletal:      Right lower leg: No edema.      Left lower leg: No edema.   Skin:     General: Skin is warm and dry.   Neurological:      Mental Status: He is alert.   Psychiatric:         Attention and Perception: Attention normal.         Mood and Affect: Mood normal. Mood is not anxious or depressed.         Speech: Speech normal.         Behavior: Behavior is cooperative.         Thought Content: Thought content normal. Thought content does not include homicidal or suicidal plan.         Judgment: Judgment normal.      Comments: Mood significantly improved compared to last week.          Procedures    Assessment/Plan   Diagnoses and all orders for this visit:    1. Severe episode of " recurrent major depressive disorder, without psychotic features (CMS/HCC) (Primary)    2. Anxiety       I'm very happy with Johnson's progress. Depression is improving.   Encouraged him to keep up the great work.   He will follow up with therapist next week.  Will continue sertraline 25mg for now- I suspect side effects (diarrhea) may resolve within the next few weeks. For now the benefits outweight the side effects.  Instructed him to call or return to clinic if symptoms worsen or side effects become unbearable.   Will plan to see him in 4 weeks for follow up, sooner if needed.       Patient Instructions    Keep up the great work!  Continue sertraline.  Return to clinic in 4 weeks for follow up.

## 2021-04-16 ENCOUNTER — BULK ORDERING (OUTPATIENT)
Dept: CASE MANAGEMENT | Facility: OTHER | Age: 41
End: 2021-04-16

## 2021-04-16 DIAGNOSIS — Z23 IMMUNIZATION DUE: ICD-10-CM

## 2021-06-21 ENCOUNTER — OFFICE VISIT (OUTPATIENT)
Dept: PODIATRY | Facility: CLINIC | Age: 41
End: 2021-06-21

## 2021-06-21 VITALS
WEIGHT: 181.2 LBS | BODY MASS INDEX: 28.44 KG/M2 | HEIGHT: 67 IN | HEART RATE: 66 BPM | DIASTOLIC BLOOD PRESSURE: 79 MMHG | SYSTOLIC BLOOD PRESSURE: 121 MMHG

## 2021-06-21 DIAGNOSIS — M79.671 RIGHT FOOT PAIN: Primary | ICD-10-CM

## 2021-06-21 DIAGNOSIS — B07.0 PLANTAR WART, RIGHT FOOT: ICD-10-CM

## 2021-06-21 PROCEDURE — 17110 DESTRUCTION B9 LES UP TO 14: CPT | Performed by: PODIATRIST

## 2021-06-21 PROCEDURE — 99203 OFFICE O/P NEW LOW 30 MIN: CPT | Performed by: PODIATRIST

## 2021-06-22 NOTE — PROGRESS NOTES
2021  Foot and Ankle Surgery - New Patient   Provider: Dr. Matthew Richards DPM  Location: [facility]    Subjective:  Johnson Fernandez is a 41 y.o. male.     Chief Complaint   Patient presents with   • Right Foot - Pain     Pain with walking bare foot        HPI: Patient is a 41-year-old male that presents with pain involving the right foot secondary to longstanding skin lesion.  Patient has noticed a lesion over the last few months.  He is unaware of any injury.  He states that the lesion has gradually increased in size and causes discomfort with barefoot walking.  He does notice some improvement with certain shoes and decreased activity.  He has not noticed any complicating features of infection or bleeding.  He denies any other issues.  He rates his symptoms a 5 out of 10 but is concerned that the lesion may increase in size and cause further issues.  Patient states that he has had similar lesions on his feet in the past which were treated with cryotherapy.    No Known Allergies    Past Medical History:   Diagnosis Date   • Anxiety        History reviewed. No pertinent surgical history.    Family History   Problem Relation Age of Onset   • Osteopenia Mother    • Hypertension Father    • Heart attack Father          MI age 42. Smoker   • Hyperlipidemia Father        Social History     Socioeconomic History   • Marital status:      Spouse name: Not on file   • Number of children: Not on file   • Years of education: Not on file   • Highest education level: Not on file   Tobacco Use   • Smoking status: Former Smoker     Packs/day: 0.00     Years: 0.00     Pack years: 0.00   • Smokeless tobacco: Never Used   Vaping Use   • Vaping Use: Every day   • Substances: THC   • Devices: Disposable   Substance and Sexual Activity   • Alcohol use: Yes     Alcohol/week: 3.0 standard drinks     Types: 3 Cans of beer per week   • Drug use: No   • Sexual activity: Yes        Current Outpatient Medications on  "File Prior to Visit   Medication Sig Dispense Refill   • Cariprazine HCl (Vraylar) 1.5 MG capsule capsule Take 1 capsule by mouth Daily. 90 capsule 0   • Cariprazine HCl (Vraylar) 1.5 MG capsule capsule Take 1 capsule by mouth Daily. 30 capsule 0   • sertraline (Zoloft) 25 MG tablet Take 1 tablet by mouth Daily. 30 tablet 1   • sertraline (ZOLOFT) 50 MG tablet Take 1 tablet by mouth Daily. 30 tablet 0   • sertraline (ZOLOFT) 50 MG tablet Take 1 tablet by mouth Daily. 30 tablet 1   • sertraline (ZOLOFT) 50 MG tablet Take 1 tablet by mouth Daily. 90 tablet 0   • ziprasidone (GEODON) 60 MG capsule        No current facility-administered medications on file prior to visit.       Review of Systems:  General: Denies fever, chills, fatigue, and weakness.  Eyes: Denies vision loss, blurry vision, and excessive redness.  ENT: Denies hearing issues and difficulty swallowing.  Cardiovascular: Denies palpitations, chest pain, or syncopal episodes.  Respiratory: Denies shortness of breath, wheezing, and coughing.  GI: Denies abdominal pain, nausea, and vomiting.   : Denies frequency, hematuria, and urgency.  Musculoskeletal: + Right foot pain  Derm: Denies rash, open wounds, or suspicious lesions.  Neuro: Denies headaches, numbness, loss of coordination, and tremors.  Psych: Denies anxiety and depression.  Endocrine: Denies temperature intolerance and changes in appetite.  Heme: Denies bleeding disorders or abnormal bruising.     Objective   /79   Pulse 66   Ht 170.2 cm (67\")   Wt 82.2 kg (181 lb 3.2 oz)   BMI 28.38 kg/m²     Foot/Ankle Exam:       General:   Appearance: appears stated age and healthy    Orientation: AAOx3    Affect: appropriate    Gait: unimpaired      VASCULAR      Right Foot Vascularity   Normal vascular exam    Dorsalis pedis:  2+  Posterior tibial:  2+  Skin Temperature: warm    Edema Grading:  None  CFT:  < 3 seconds  Pedal Hair Growth:  Present  Varicosities: none        NEUROLOGIC     Right " Foot Neurologic   Light touch sensation:  Normal  Hot/Cold sensation: normal    Achilles reflex:  2+     MUSCULOSKELETAL      Right Foot Musculoskeletal   Ecchymosis:  None  Tenderness: right foot callus    Arch:  Normal     MUSCLE STRENGTH     Right Foot Muscle Strength   Normal strength    Foot dorsiflexion:  5  Foot plantar flexion:  5  Foot inversion:  5  Foot eversion:  5     DERMATOLOGIC     Right Foot Dermatologic   Skin: corn and warts       TESTS     Right Foot Tests   Anterior drawer: negative    Varus tilt: negative       Image:       Assessment/Plan   Diagnoses and all orders for this visit:    1. Right foot pain (Primary)    2. Plantar wart, right foot      Patient presents with longstanding skin lesion involving the plantar medial aspect of the right heel.  He states that these issues have been recalcitrant despite over-the-counter treatments.  He does have pain with increased activity and walking barefoot.  After evaluation, I explained that the lesion is consistent with a plantar wart.  We did review the diagnosis and treatment options.  I have recommended that we proceed with Cantharone application under occlusion.  We did discuss the procedure and aftercare instructions.  Procedure was performed without complication.  I have asked that he monitor closely and call with any issues or concerns.  I would like to see him in 2 weeks for reevaluation.    Cantharone application under occlusion: Right foot    Verbal consent was obtained prior to procedure.  Debridement was performed with a 15 blade and Cantharone was applied in the standard fashion to the base of the lesion and occluded with moleskin.  We did review aftercare instructions.  Patient tolerated the procedure well.      No orders of the defined types were placed in this encounter.       Note is dictated utilizing voice recognition software. Unfortunately this leads to occasional typographical errors. I apologize in advance if the situation  occurs. If questions occur please do not hesitate to call our office.

## 2021-06-22 NOTE — PATIENT INSTRUCTIONS
Plantar Warts  Warts are small growths on the skin. When they occur on the underside (sole) of the foot, they are called plantar warts. Plantar warts often occur in groups, with several small warts around a larger wart. They tend to develop on the heel or the ball of the foot. They may grow into the deeper layers of skin or rise above the surface of the skin.  Most warts are not painful, and they usually do not cause problems. However, plantar warts may cause pain when you walk because pressure is applied to them. Plantar warts may spread to other areas of the sole. They can also spread to other areas of the body through direct and indirect contact. Warts often go away on their own in time. Various treatments may be done if needed or desired.  What are the causes?  Plantar warts are caused by a type of virus that is called human papillomavirus (HPV).  · Walking barefoot can cause exposure to the virus, especially if your feet are wet.  · HPV attacks a break in the skin of the foot.  What increases the risk?  You are more likely to develop this condition if you:  · Are between 10-20 years of age.  · Use public showers or locker rooms.  · Have a weakened body defense system (immune system).  What are the signs or symptoms?  Common symptoms of this condition include:  · Flat or slightly raised growths that have a rough surface and look similar to a callus.  · Pain when you use your foot to support your body weight.  How is this diagnosed?  A plantar wart can usually be diagnosed from its appearance. In some cases, a tissue sample may be removed (biopsy) to be looked at under a microscope.  How is this treated?  In many cases, warts do not need treatment. Without treatment, they often go away with time. If treatment is needed or desired, options may include:  · Applying medicated solutions, creams, or patches to the wart. These may be over-the-counter or prescription medicines that make the skin soft so that layers will  gradually shed away. In many cases, the medicine is applied one or two times a day and covered with a bandage.  · Freezing the wart with liquid nitrogen (cryotherapy).  · Burning the wart with:  ? Laser treatment.  ? An electrified probe (electrocautery).  · Injecting a medicine (Candida antigen) into the wart to help the body's immune system fight off the wart.  · Having surgery to remove the wart.  · Putting duct tape over the top of the wart (occlusion). You will leave the tape in place for as long as told by your health care provider, and then you will replace it with a new strip of tape. This is done until the wart goes away.  Repeat treatment may be needed if you choose to remove warts. Warts sometimes go away and come back again.  Follow these instructions at home:  · Apply medicated creams or solutions only as told by your health care provider. This may involve:  ? Soaking the affected area in warm water.  ? Removing the top layer of softened skin before you apply the medicine. A pumice stone works well for removing the skin.  ? Applying a bandage over the affected area after you apply the medicine.  ? Repeating the process daily or as told by your health care provider.  · Do not scratch or pick at a wart.  · Wash your hands after you touch a wart.  · If a wart is painful, try covering it with a bandage that has a hole in the middle. This helps to take pressure off the wart.  · Keep all follow-up visits as told by your health care provider. This is important.  How is this prevented?  Take these actions to help prevent warts:  · Wear shoes and socks. Change your socks daily.  · Keep your feet clean and dry.  · Do not walk barefoot in shared locker rooms, shower areas, or swimming pools.  · Check your feet regularly.  · Avoid direct contact with warts on other people.  Contact a health care provider if:  · Your warts do not improve after treatment.  · You have redness, swelling, or pain at the site of a  wart.  · You have bleeding from a wart that does not stop with light pressure.  · You have diabetes and you develop a wart.  Summary  · Warts are small growths on the skin. When they occur on the underside (sole) of the foot, they are called plantar warts.  · In many cases, warts do not need treatment. Without treatment, they often go away with time.  · Apply medicated creams or solutions only as told by your health care provider.  · Do not scratch or pick at a wart. Wash your hands after you touch a wart.  · Keep all follow-up visits as told by your health care provider. This is important.  This information is not intended to replace advice given to you by your health care provider. Make sure you discuss any questions you have with your health care provider.  Document Revised: 07/16/2019 Document Reviewed: 07/16/2019  [employer] Patient Education © 2021 [employer]

## 2021-07-07 ENCOUNTER — OFFICE VISIT (OUTPATIENT)
Dept: PODIATRY | Facility: CLINIC | Age: 41
End: 2021-07-07

## 2021-07-07 VITALS
SYSTOLIC BLOOD PRESSURE: 113 MMHG | HEIGHT: 67 IN | DIASTOLIC BLOOD PRESSURE: 78 MMHG | BODY MASS INDEX: 28.91 KG/M2 | HEART RATE: 65 BPM | WEIGHT: 184.2 LBS

## 2021-07-07 DIAGNOSIS — M79.671 RIGHT FOOT PAIN: Primary | ICD-10-CM

## 2021-07-07 DIAGNOSIS — B07.0 PLANTAR WART, RIGHT FOOT: ICD-10-CM

## 2021-07-07 PROCEDURE — 99212 OFFICE O/P EST SF 10 MIN: CPT | Performed by: PODIATRIST

## 2021-07-08 NOTE — PROGRESS NOTES
"07/07/2021  Foot and Ankle Surgery - Established Patient/Follow-up  Provider: Dr. Matthew Richards DPM  Location: [facility]    Subjective:  Johnson Fernandez is a 41 y.o. male.     Chief Complaint   Patient presents with   • Right Foot - Follow-up       HPI: Patient returns for follow-up on right foot plantar wart.  He states that he has noticed substantial improvement since last exam.  He did have fairly significant discomfort after Cantharone application.  Denies any new issues today    No Known Allergies    Current Outpatient Medications on File Prior to Visit   Medication Sig Dispense Refill   • Cariprazine HCl (Vraylar) 1.5 MG capsule capsule Take 1 capsule by mouth Daily. 90 capsule 0   • sertraline (ZOLOFT) 50 MG tablet Take 1 tablet by mouth Daily. 30 tablet 0   • ziprasidone (GEODON) 60 MG capsule        No current facility-administered medications on file prior to visit.       Objective   /78   Pulse 65   Ht 170.2 cm (67\")   Wt 83.6 kg (184 lb 3.2 oz)   BMI 28.85 kg/m²     General:   Appearance: appears stated age and healthy    Orientation: AAOx3    Affect: appropriate    Gait: unimpaired       VASCULAR       Right Foot Vascularity   Normal vascular exam    Dorsalis pedis:  2+  Posterior tibial:  2+  Skin Temperature: warm    Edema Grading:  None  CFT:  < 3 seconds  Pedal Hair Growth:  Present  Varicosities: none        NEUROLOGIC      Right Foot Neurologic   Light touch sensation:  Normal  Hot/Cold sensation: normal    Achilles reflex:  2+      MUSCULOSKELETAL       Right Foot Musculoskeletal   Ecchymosis:  None  Tenderness: right foot callus    Arch:  Normal      MUSCLE STRENGTH      Right Foot Muscle Strength   Normal strength    Foot dorsiflexion:  5  Foot plantar flexion:  5  Foot inversion:  5  Foot eversion:  5      DERMATOLOGIC      Right Foot Dermatologic   Skin: Blister formation involving the plantar medial aspect of the heel.  After removal, no residual underlying verruca " present.      TESTS      Right Foot Tests   Anterior drawer: negative    Varus tilt: negative      Assessment/Plan   Diagnoses and all orders for this visit:    1. Right foot pain (Primary)    2. Plantar wart, right foot      Patient returns after Cantharone application.  He did notice discomfort for the first week but states that he is doing much better at this time.  He no longer has any prominent pain.  The blister was removed today showing no residual verruca at this time.  I have asked that he continue to monitor.  He is to call with any progressive issues or concerns.  I will see him on an as-needed basis     No orders of the defined types were placed in this encounter.         Note is dictated utilizing voice recognition software. Unfortunately this leads to occasional typographical errors. I apologize in advance if the situation occurs. If questions occur please do not hesitate to call our office.

## 2021-10-27 ENCOUNTER — OFFICE VISIT (OUTPATIENT)
Dept: PODIATRY | Facility: CLINIC | Age: 41
End: 2021-10-27

## 2021-10-27 VITALS
HEART RATE: 67 BPM | SYSTOLIC BLOOD PRESSURE: 138 MMHG | WEIGHT: 184 LBS | BODY MASS INDEX: 28.88 KG/M2 | HEIGHT: 67 IN | DIASTOLIC BLOOD PRESSURE: 89 MMHG

## 2021-10-27 DIAGNOSIS — M79.671 RIGHT FOOT PAIN: Primary | ICD-10-CM

## 2021-10-27 DIAGNOSIS — B07.0 PLANTAR WART, RIGHT FOOT: ICD-10-CM

## 2021-10-27 PROCEDURE — 99214 OFFICE O/P EST MOD 30 MIN: CPT | Performed by: PODIATRIST

## 2021-10-28 NOTE — PROGRESS NOTES
"10/27/2021  Foot and Ankle Surgery - Established Patient/Follow-up  Provider: Dr. Matthew Richards DPM  Location: [facility]    Subjective:  Johnson Fernandez is a 41 y.o. male.     Chief Complaint   Patient presents with   • Right Foot - Plantar Warts   • Follow-up     last pcp appt 12/12/2019       HPI: Patient returns for continued lesion involving the plantar aspect of his right foot.  Patient did notice some improvement after previous Cantharone application and over-the-counter treatments but states that the lesion is now larger and causing pain on a daily basis.  He would like to discuss definitive treatment options today.    No Known Allergies    Current Outpatient Medications on File Prior to Visit   Medication Sig Dispense Refill   • Cariprazine HCl (Vraylar) 1.5 MG capsule capsule Take 1 capsule by mouth Daily. 90 capsule 0   • desvenlafaxine (PRISTIQ) 50 MG 24 hr tablet Take 1 tablet by mouth Daily. 30 tablet 1   • Desvenlafaxine Succinate ER 25 MG tablet sustained-release 24 hour Take 1 Tablet(s) 1 time a day 30 tablet 1   • sertraline (ZOLOFT) 50 MG tablet Take 1 tablet by mouth Daily. 30 tablet 0   • ziprasidone (GEODON) 60 MG capsule        No current facility-administered medications on file prior to visit.       Objective   /89   Pulse 67   Ht 170.2 cm (67\")   Wt 83.5 kg (184 lb)   BMI 28.82 kg/m²     General:   Appearance: appears stated age and healthy    Orientation: AAOx3    Affect: appropriate    Gait: unimpaired       VASCULAR       Right Foot Vascularity   Normal vascular exam    Dorsalis pedis:  2+  Posterior tibial:  2+  Skin Temperature: warm    Edema Grading:  None  CFT:  < 3 seconds  Pedal Hair Growth:  Present  Varicosities: none        NEUROLOGIC      Right Foot Neurologic   Light touch sensation:  Normal  Hot/Cold sensation: normal    Achilles reflex:  2+      MUSCULOSKELETAL       Right Foot Musculoskeletal   Ecchymosis:  None  Tenderness: right foot " callus    Arch:  Normal      MUSCLE STRENGTH      Right Foot Muscle Strength   Normal strength    Foot dorsiflexion:  5  Foot plantar flexion:  5  Foot inversion:  5  Foot eversion:  5      DERMATOLOGIC      Right Foot Dermatologic   Skin:  Large hyperkeratotic lesion involving the plantar medial aspect of the heel.  The lesion measures approximately 1 cm in diameter with tenderness with palpation.  Lesion is very consistent with verruca.      TESTS      Right Foot Tests   Anterior drawer: negative    Varus tilt: negative      Assessment/Plan   Diagnoses and all orders for this visit:    1. Right foot pain (Primary)    2. Plantar wart, right foot  -     COVID PRE-OP / PRE-PROCEDURE SCREENING ORDER (NO ISOLATION) - Swab, Nasopharynx; Future  -     CBC (No Diff); Future  -     Basic Metabolic Panel; Future  -     ECG 12 Lead; Future  -     XR Chest 2 View; Future  -     Case Request; Standing    Other orders  -     Follow Anesthesia Guidelines / Standing Orders; Future  -     Obtain Informed Consent; Future  -     Provide NPO Instructions to Patient; Future  -     Chlorhexidine Skin Prep; Future      Patient returns with continued lesion involving the plantar medial aspect of the right heel.  He does have pain with weightbearing activities and is tired of dealing with this issue.  He has tried conservative methods in the past for relief but continues to have issues.  I did discuss proceeding with laser ablation.  We did review procedure, risk, goals, and recovery.  He does understand that risk of recurrence is present despite best efforts.  He would like to proceed in the near future.  I have asked that he call with any new issues or concerns.  Greater than 30 minutes was spent before, during, and after evaluation for patient care    Orders Placed This Encounter   Procedures   • COVID PRE-OP / PRE-PROCEDURE SCREENING ORDER (NO ISOLATION) - Swab, Nasopharynx     Standing Status:   Future     Standing Expiration Date:    10/28/2022     Order Specific Question:   Please select your location:     Answer:   [facility]     Order Specific Question:   COVID Screening Order:     Answer:   In-House: EMERGENT/PREOP-CEPHEID, 3-4 HR TAT [[id number]]     Order Specific Question:   Previously tested for COVID-19?     Answer:   Unknown     Order Specific Question:   Employed in healthcare setting?     Answer:   No     Order Specific Question:   Symptomatic for COVID-19 as defined by CDC?     Answer:   No     Order Specific Question:   Hospitalized for COVID-19?     Answer:   No     Order Specific Question:   Admitted to ICU for COVID-19?     Answer:   No     Order Specific Question:   Resident in a congregate (group) care setting?     Answer:   No     Order Specific Question:   Release to patient     Answer:   Immediate   • XR Chest 2 View     Standing Status:   Future     Standing Expiration Date:   10/28/2022     Order Specific Question:   Reason for Exam:     Answer:   Preop   • CBC (No Diff)     Standing Status:   Future     Standing Expiration Date:   10/28/2022     Order Specific Question:   Release to patient     Answer:   Immediate   • Basic Metabolic Panel     Standing Status:   Future     Standing Expiration Date:   10/28/2022     Order Specific Question:   Release to patient     Answer:   Immediate   • Follow Anesthesia Guidelines / Standing Orders     Standing Status:   Future   • Obtain Informed Consent     Standing Status:   Future     Order Specific Question:   Informed Consent Given For     Answer:   Laser ablation of plantar skin lesion to the right foot   • Provide NPO Instructions to Patient     Standing Status:   Future   • Chlorhexidine Skin Prep     Chlorhexidine Skin Prep and Instructions For All Patients Having A Procedure Requiring an Outward Incision if Not Allergic. If Allergic, Give Antibacterial Skin Wipes and Instructions. Do Not Use For Facial Cases or on Any Mucus Membranes.     Standing Status:   Future   • ECG 12 Lead      Standing Status:   Future     Standing Expiration Date:   10/28/2022     Order Specific Question:   Reason for Exam:     Answer:   Preop          Note is dictated utilizing voice recognition software. Unfortunately this leads to occasional typographical errors. I apologize in advance if the situation occurs. If questions occur please do not hesitate to call our office.

## 2021-11-01 PROBLEM — B07.0 PLANTAR WART, RIGHT FOOT: Status: ACTIVE | Noted: 2021-11-01

## 2021-11-02 ENCOUNTER — HOSPITAL ENCOUNTER (OUTPATIENT)
Dept: CARDIOLOGY | Facility: HOSPITAL | Age: 41
Discharge: HOME | End: 2021-11-02

## 2021-11-02 ENCOUNTER — LAB (OUTPATIENT)
Dept: LAB | Facility: HOSPITAL | Age: 41
End: 2021-11-02

## 2021-11-02 ENCOUNTER — HOSPITAL ENCOUNTER (OUTPATIENT)
Dept: GENERAL RADIOLOGY | Facility: HOSPITAL | Age: 41
Discharge: HOME | End: 2021-11-02

## 2021-11-02 DIAGNOSIS — B07.0 PLANTAR WART, RIGHT FOOT: ICD-10-CM

## 2021-11-02 LAB
ANION GAP SERPL CALCULATED.3IONS-SCNC: 6.3 MMOL/L (ref 5–15)
BUN SERPL-MCNC: 11 MG/DL (ref 6–20)
BUN/CREAT SERPL: 11 (ref 7–25)
CALCIUM SPEC-SCNC: 9.1 MG/DL (ref 8.6–10.5)
CHLORIDE SERPL-SCNC: 102 MMOL/L (ref 98–107)
CO2 SERPL-SCNC: 27.7 MMOL/L (ref 22–29)
CREAT SERPL-MCNC: 1 MG/DL (ref 0.76–1.27)
DEPRECATED RDW RBC AUTO: 43.4 FL (ref 37–54)
ERYTHROCYTE [DISTWIDTH] IN BLOOD BY AUTOMATED COUNT: 13 % (ref 12.3–15.4)
GFR SERPL CREATININE-BSD FRML MDRD: 100 ML/MIN/1.73
GFR SERPL CREATININE-BSD FRML MDRD: 83 ML/MIN/1.73
GLUCOSE SERPL-MCNC: 96 MG/DL (ref 65–99)
HCT VFR BLD AUTO: 47.7 % (ref 37.5–51)
HGB BLD-MCNC: 14.9 G/DL (ref 13–17.7)
MCH RBC QN AUTO: 28.2 PG (ref 26.6–33)
MCHC RBC AUTO-ENTMCNC: 31.2 G/DL (ref 31.5–35.7)
MCV RBC AUTO: 90.2 FL (ref 79–97)
PLATELET # BLD AUTO: 226 10*3/MM3 (ref 140–450)
PMV BLD AUTO: 10.7 FL (ref 6–12)
POTASSIUM SERPL-SCNC: 4.2 MMOL/L (ref 3.5–5.2)
QT INTERVAL: 396 MS
RBC # BLD AUTO: 5.29 10*6/MM3 (ref 4.14–5.8)
SODIUM SERPL-SCNC: 136 MMOL/L (ref 136–145)
WBC # BLD AUTO: 6.97 10*3/MM3 (ref 3.4–10.8)

## 2021-11-02 PROCEDURE — 93010 ELECTROCARDIOGRAM REPORT: CPT | Performed by: INTERNAL MEDICINE

## 2021-11-02 PROCEDURE — 80048 BASIC METABOLIC PNL TOTAL CA: CPT

## 2021-11-02 PROCEDURE — 71046 X-RAY EXAM CHEST 2 VIEWS: CPT

## 2021-11-02 PROCEDURE — 36415 COLL VENOUS BLD VENIPUNCTURE: CPT

## 2021-11-02 PROCEDURE — 85027 COMPLETE CBC AUTOMATED: CPT

## 2021-11-02 PROCEDURE — 93005 ELECTROCARDIOGRAM TRACING: CPT | Performed by: PODIATRIST

## 2021-11-05 ENCOUNTER — LAB (OUTPATIENT)
Dept: LAB | Facility: HOSPITAL | Age: 41
End: 2021-11-05

## 2021-11-05 ENCOUNTER — ANESTHESIA EVENT (OUTPATIENT)
Dept: PERIOP | Facility: HOSPITAL | Age: 41
End: 2021-11-05

## 2021-11-05 DIAGNOSIS — Z01.818 PRE-OP TESTING: Primary | ICD-10-CM

## 2021-11-05 LAB — SARS-COV-2 ORF1AB RESP QL NAA+PROBE: NOT DETECTED

## 2021-11-05 PROCEDURE — C9803 HOPD COVID-19 SPEC COLLECT: HCPCS

## 2021-11-05 PROCEDURE — U0004 COV-19 TEST NON-CDC HGH THRU: HCPCS

## 2021-11-08 ENCOUNTER — HOSPITAL ENCOUNTER (OUTPATIENT)
Facility: HOSPITAL | Age: 41
Setting detail: HOSPITAL OUTPATIENT SURGERY
Discharge: HOME | End: 2021-11-08
Attending: PODIATRIST | Admitting: PODIATRIST

## 2021-11-08 ENCOUNTER — ANESTHESIA (OUTPATIENT)
Dept: PERIOP | Facility: HOSPITAL | Age: 41
End: 2021-11-08

## 2021-11-08 VITALS
DIASTOLIC BLOOD PRESSURE: 80 MMHG | WEIGHT: 176.15 LBS | HEIGHT: 67 IN | TEMPERATURE: 96 F | SYSTOLIC BLOOD PRESSURE: 128 MMHG | RESPIRATION RATE: 16 BRPM | BODY MASS INDEX: 27.65 KG/M2 | OXYGEN SATURATION: 96 % | HEART RATE: 70 BPM

## 2021-11-08 DIAGNOSIS — B07.0 PLANTAR WART, RIGHT FOOT: Primary | ICD-10-CM

## 2021-11-08 PROCEDURE — 25010000002 FENTANYL CITRATE (PF) 50 MCG/ML SOLUTION: Performed by: ANESTHESIOLOGY

## 2021-11-08 PROCEDURE — 17110 DESTRUCTION B9 LES UP TO 14: CPT | Performed by: PODIATRIST

## 2021-11-08 PROCEDURE — 0 LIDOCAINE 1 % SOLUTION: Performed by: PODIATRIST

## 2021-11-08 PROCEDURE — 25010000002 DEXAMETHASONE PER 1 MG: Performed by: ANESTHESIOLOGY

## 2021-11-08 PROCEDURE — 25010000002 ONDANSETRON PER 1 MG: Performed by: ANESTHESIOLOGY

## 2021-11-08 PROCEDURE — 25010000002 PROPOFOL 10 MG/ML EMULSION: Performed by: ANESTHESIOLOGY

## 2021-11-08 RX ORDER — SODIUM CHLORIDE, SODIUM LACTATE, POTASSIUM CHLORIDE, CALCIUM CHLORIDE 600; 310; 30; 20 MG/100ML; MG/100ML; MG/100ML; MG/100ML
9 INJECTION, SOLUTION INTRAVENOUS CONTINUOUS PRN
Status: DISCONTINUED | OUTPATIENT
Start: 2021-11-08 | End: 2021-11-08 | Stop reason: HOSPADM

## 2021-11-08 RX ORDER — DEXAMETHASONE SODIUM PHOSPHATE 4 MG/ML
INJECTION, SOLUTION INTRA-ARTICULAR; INTRALESIONAL; INTRAMUSCULAR; INTRAVENOUS; SOFT TISSUE AS NEEDED
Status: DISCONTINUED | OUTPATIENT
Start: 2021-11-08 | End: 2021-11-08 | Stop reason: SURG

## 2021-11-08 RX ORDER — SODIUM CHLORIDE 0.9 % (FLUSH) 0.9 %
10 SYRINGE (ML) INJECTION EVERY 12 HOURS SCHEDULED
Status: DISCONTINUED | OUTPATIENT
Start: 2021-11-08 | End: 2021-11-08 | Stop reason: HOSPADM

## 2021-11-08 RX ORDER — ONDANSETRON 2 MG/ML
4 INJECTION INTRAMUSCULAR; INTRAVENOUS ONCE AS NEEDED
Status: DISCONTINUED | OUTPATIENT
Start: 2021-11-08 | End: 2021-11-08 | Stop reason: HOSPADM

## 2021-11-08 RX ORDER — FENTANYL CITRATE 50 UG/ML
INJECTION, SOLUTION INTRAMUSCULAR; INTRAVENOUS AS NEEDED
Status: DISCONTINUED | OUTPATIENT
Start: 2021-11-08 | End: 2021-11-08 | Stop reason: SURG

## 2021-11-08 RX ORDER — HYDROCODONE BITARTRATE AND ACETAMINOPHEN 7.5; 325 MG/1; MG/1
1 TABLET ORAL EVERY 6 HOURS PRN
Qty: 28 TABLET | Refills: 0 | OUTPATIENT
Start: 2021-11-08 | End: 2021-11-08 | Stop reason: SDUPTHER

## 2021-11-08 RX ORDER — DROPERIDOL 2.5 MG/ML
0.62 INJECTION, SOLUTION INTRAMUSCULAR; INTRAVENOUS ONCE AS NEEDED
Status: DISCONTINUED | OUTPATIENT
Start: 2021-11-08 | End: 2021-11-08 | Stop reason: HOSPADM

## 2021-11-08 RX ORDER — HYDROCODONE BITARTRATE AND ACETAMINOPHEN 7.5; 325 MG/1; MG/1
1 TABLET ORAL EVERY 6 HOURS PRN
Qty: 28 TABLET | Refills: 0 | Status: SHIPPED | OUTPATIENT
Start: 2021-11-08 | End: 2022-05-03 | Stop reason: ALTCHOICE

## 2021-11-08 RX ORDER — PROPOFOL 10 MG/ML
VIAL (ML) INTRAVENOUS AS NEEDED
Status: DISCONTINUED | OUTPATIENT
Start: 2021-11-08 | End: 2021-11-08 | Stop reason: SURG

## 2021-11-08 RX ORDER — LIDOCAINE HYDROCHLORIDE 10 MG/ML
INJECTION, SOLUTION INFILTRATION; PERINEURAL AS NEEDED
Status: DISCONTINUED | OUTPATIENT
Start: 2021-11-08 | End: 2021-11-08 | Stop reason: HOSPADM

## 2021-11-08 RX ORDER — MORPHINE SULFATE 4 MG/ML
2 INJECTION, SOLUTION INTRAMUSCULAR; INTRAVENOUS
Status: DISCONTINUED | OUTPATIENT
Start: 2021-11-08 | End: 2021-11-08 | Stop reason: HOSPADM

## 2021-11-08 RX ORDER — ONDANSETRON 2 MG/ML
INJECTION INTRAMUSCULAR; INTRAVENOUS AS NEEDED
Status: DISCONTINUED | OUTPATIENT
Start: 2021-11-08 | End: 2021-11-08 | Stop reason: SURG

## 2021-11-08 RX ORDER — SODIUM CHLORIDE 0.9 % (FLUSH) 0.9 %
10 SYRINGE (ML) INJECTION AS NEEDED
Status: DISCONTINUED | OUTPATIENT
Start: 2021-11-08 | End: 2021-11-08 | Stop reason: HOSPADM

## 2021-11-08 RX ADMIN — SODIUM CHLORIDE, POTASSIUM CHLORIDE, SODIUM LACTATE AND CALCIUM CHLORIDE 9 ML/HR: 600; 310; 30; 20 INJECTION, SOLUTION INTRAVENOUS at 11:11

## 2021-11-08 RX ADMIN — PROPOFOL 200 MG: 10 INJECTION, EMULSION INTRAVENOUS at 13:48

## 2021-11-08 RX ADMIN — DEXAMETHASONE SODIUM PHOSPHATE 4 MG: 4 INJECTION, SOLUTION INTRAMUSCULAR; INTRAVENOUS at 13:55

## 2021-11-08 RX ADMIN — FENTANYL CITRATE 100 MCG: 50 INJECTION, SOLUTION INTRAMUSCULAR; INTRAVENOUS at 13:46

## 2021-11-08 RX ADMIN — ONDANSETRON 4 MG: 2 INJECTION INTRAMUSCULAR; INTRAVENOUS at 13:55

## 2021-11-08 NOTE — ANESTHESIA PREPROCEDURE EVALUATION
Anesthesia Evaluation     NPO Solid Status: > 8 hours  NPO Liquid Status: > 8 hours           Airway   Mallampati: II  TM distance: >3 FB  No difficulty expected  Dental      Pulmonary    Cardiovascular     (+) hyperlipidemia,       Neuro/Psych  (+) psychiatric history Anxiety,     GI/Hepatic/Renal/Endo      Musculoskeletal     Abdominal    Substance History      OB/GYN          Other                        Anesthesia Plan    ASA 2     general     intravenous induction     Anesthetic plan, all risks, benefits, and alternatives have been provided, discussed and informed consent has been obtained with: patient.    Plan discussed with CAA.

## 2021-11-08 NOTE — ANESTHESIA PROCEDURE NOTES
Airway  Date/Time: 11/8/2021 1:47 PM  Airway not difficult    General Information and Staff    Patient location during procedure: OR  Anesthesiologist: Buster Lewis MD    Indications and Patient Condition  Indications for airway management: CNS depression    Preoxygenated: yes  Mask difficulty assessment: 0 - not attempted    Final Airway Details  Final airway type: supraglottic airway      Successful airway: LMA  Size 4    Number of attempts at approach: 1  Assessment: lips, teeth, and gum same as pre-op and atraumatic intubation

## 2021-11-08 NOTE — ANESTHESIA POSTPROCEDURE EVALUATION
Patient: Johnson Fernandez    Procedure Summary     Date: 11/08/21 Room / Location: [facility] OR 12 / [facility] MAIN OR    Anesthesia Start: 1340 Anesthesia Stop: 1407    Procedure: WART REMOVAL WITH LASER FOOT (Right Foot) Diagnosis:       Plantar wart, right foot      (Plantar wart, right foot [B07.0])    Surgeons: DOMINIQUE Richards DPM Provider: Buster Lewis MD    Anesthesia Type: general ASA Status: 2          Anesthesia Type: general    Vitals  Vitals Value Taken Time   /73 11/08/21 1415   Temp 97.4 °F (36.3 °C) 11/08/21 1407   Pulse 67 11/08/21 1418   Resp 14 11/08/21 1415   SpO2 97 % 11/08/21 1418   Vitals shown include unvalidated device data.        Post Anesthesia Care and Evaluation    Patient location during evaluation: PACU  Patient participation: complete - patient participated  Level of consciousness: awake  Pain scale: See nurse's notes for pain score.  Pain management: adequate  Airway patency: patent  Anesthetic complications: No anesthetic complications  PONV Status: none  Cardiovascular status: acceptable  Respiratory status: acceptable  Hydration status: acceptable    Comments: Patient seen and examined postoperatively; vital signs stable; SpO2 greater than or equal to 90%; cardiopulmonary status stable; nausea/vomiting adequately controlled; pain adequately controlled; no apparent anesthesia complications; patient discharged from anesthesia care when discharge criteria were met

## 2021-11-08 NOTE — OP NOTE
Operative Note   Foot and Ankle Surgery   Provider: Dr. Matthew Richards   Location: [facility]      Procedure:  1.  Laser ablation of benign skin lesion, right foot    Pre-operative Diagnosis:   1.  Plantar wart, right foot    Post-operative Diagnosis: Same    Surgeon: Matthew Richards    Assistant: None    Anesthesia: General    Implants: None    Findings: No unexpected findings    Specimen: None    Blood Loss: Less than 5cc    Complications: None    Post Op Plan: Discharge home.  Full weightbearing activity as needed in postop shoe.  Follow-up with me in 1 to 2 weeks.    Summary:    Patient is a 41-year-old male that has been dealing with longstanding skin lesion involving the plantar medial aspect of his right heel.  He has prominent pain and limitation.  He has tried previous Cantharone application under occlusion and home remedies without improvement.  We did discuss laser ablation.  Patient would like to proceed.    Procedure, risks, complications, and goals were discussed with the patient at bedside.  Risks include but are not limited to infection, complications from anesthesia (including death), chronic pain or numbness, hematoma/seroma, deep vein thrombosis, wound complications, and potential for additional surgical procedures.  Patient understands and elects to proceed with surgery at this time. Informed consent was obtained before proceeding to the operating suite.  All questions were answered to the patient's satisfaction. No guarantees or assurances were given or implied.    Procedure:    Patient was brought to the operating room placed on the operative table in the supine position.  Once adequate general anesthesia was administered, the operative foot was scrubbed with Betadine.  A formal timeout was conducted prior skin incision.    Prior to the procedure, the solitary lesion was anesthetized utilizing a total of 10 cc of 1% lidocaine plain.  Ablation of the lesions was performed with 7 W.  The  necrotic eschar over the lesions was obtained and then curetted allowing for a healthy appearing base.  Hemostasis was achieved and a final pass of the laser at 5 W was performed.  No residual abnormal tissue remained.  The wounds were dressed with Silvadene and sterile compressive dressings.  Patient tolerated the procedure and anesthesia well.  She was transferred from the operating room to the recovery room with vital signs stable and neurovascular is unchanged to the operative extremity.        Dr. Matthew Richards, HUONG  [facility]  [phone]    Note is dictated utilizing voice recognition software. Unfortunately this leads to occasional typographical errors. I apologize in advance if the situation occurs. If questions occur please do not hesitate to call our office.

## 2021-11-16 ENCOUNTER — OFFICE VISIT (OUTPATIENT)
Dept: PODIATRY | Facility: CLINIC | Age: 41
End: 2021-11-16

## 2021-11-16 VITALS
HEIGHT: 67 IN | DIASTOLIC BLOOD PRESSURE: 77 MMHG | HEART RATE: 78 BPM | SYSTOLIC BLOOD PRESSURE: 120 MMHG | BODY MASS INDEX: 27.62 KG/M2 | WEIGHT: 176 LBS

## 2021-11-16 DIAGNOSIS — B07.0 PLANTAR WART, RIGHT FOOT: Primary | ICD-10-CM

## 2021-11-16 PROCEDURE — 99024 POSTOP FOLLOW-UP VISIT: CPT | Performed by: PODIATRIST

## 2021-11-16 NOTE — PROGRESS NOTES
"11/16/2021  Foot and Ankle Surgery - Established Patient/Follow-up  Provider: Dr. Matthew Richards DPM  Location: [facility]    Subjective:  Johnson Fernandez is a 41 y.o. male.     Chief Complaint   Patient presents with   • Right Foot - Pain   • Post-op     last pcp appt 12/12/2009       HPI: Patient returns for follow-up regarding his right foot approximately 2 weeks after laser ablation.  He states that he is doing well.  His pain has dissipated.  He did have moderate bleeding.  No issues at this time    No Known Allergies    Current Outpatient Medications on File Prior to Visit   Medication Sig Dispense Refill   • amphetamine-dextroamphetamine XR (ADDERALL XR) 25 MG 24 hr capsule Take 1 capsule by mouth daily. 30 capsule 0   • Cariprazine HCl (Vraylar) 1.5 MG capsule capsule Take 1 capsule by mouth Daily. (Patient taking differently: Take 1.5 mg by mouth Daily. No longer taking) 90 capsule 0   • Desvenlafaxine Succinate ER 25 MG tablet sustained-release 24 hour Take 1 Tablet(s) 1 time a day 30 tablet 1   • sertraline (ZOLOFT) 50 MG tablet Take 1 tablet by mouth Daily. (Patient taking differently: Take 50 mg by mouth Daily. No longer taking) 30 tablet 0   • HYDROcodone-acetaminophen (NORCO) 7.5-325 MG per tablet Take 1 tablet by mouth Every 6 (Six) Hours As Needed for Moderate Pain  (Pain). 28 tablet 0   • [DISCONTINUED] HYDROcodone-acetaminophen (Norco) 7.5-325 MG per tablet Take 1 tablet by mouth Every 6 (Six) Hours As Needed for pain 28 tablet 0   • [DISCONTINUED] ziprasidone (GEODON) 60 MG capsule        No current facility-administered medications on file prior to visit.       Objective   /77   Pulse 78   Ht 170.2 cm (67\")   Wt 79.8 kg (176 lb)   BMI 27.57 kg/m²     General:   Appearance: appears stated age and healthy    Orientation: AAOx3    Affect: appropriate    Gait: unimpaired       VASCULAR       Right Foot Vascularity   Normal vascular exam    Dorsalis pedis:  2+  Posterior " tibial:  2+  Skin Temperature: warm    Edema Grading:  None  CFT:  < 3 seconds  Pedal Hair Growth:  Present  Varicosities: none        NEUROLOGIC      Right Foot Neurologic   Light touch sensation:  Normal  Hot/Cold sensation: normal    Achilles reflex:  2+      MUSCULOSKELETAL       Right Foot Musculoskeletal   Ecchymosis:  None  Tenderness: right foot callus    Arch:  Normal      MUSCLE STRENGTH      Right Foot Muscle Strength   Normal strength    Foot dorsiflexion:  5  Foot plantar flexion:  5  Foot inversion:  5  Foot eversion:  5      DERMATOLOGIC      Right Foot Dermatologic   Skin:   Wound site is improving.  No obvious residual verruca at this time.    Assessment/Plan   Diagnoses and all orders for this visit:    1. Plantar wart, right foot (Primary)      Patient is doing well.  I would like him to apply antibiotic ointment on a daily basis and cover with activity.  He may shower and bathe as needed.  I would like him to perform normal daily activities as needed.  I would like to see him in 4 weeks for reevaluation    No orders of the defined types were placed in this encounter.         Note is dictated utilizing voice recognition software. Unfortunately this leads to occasional typographical errors. I apologize in advance if the situation occurs. If questions occur please do not hesitate to call our office.

## 2021-12-14 ENCOUNTER — OFFICE VISIT (OUTPATIENT)
Dept: PODIATRY | Facility: CLINIC | Age: 41
End: 2021-12-14

## 2021-12-14 VITALS
BODY MASS INDEX: 27.62 KG/M2 | WEIGHT: 176 LBS | HEIGHT: 67 IN | SYSTOLIC BLOOD PRESSURE: 146 MMHG | DIASTOLIC BLOOD PRESSURE: 84 MMHG | HEART RATE: 105 BPM

## 2021-12-14 DIAGNOSIS — B07.0 PLANTAR WART, RIGHT FOOT: Primary | ICD-10-CM

## 2021-12-14 PROCEDURE — 99212 OFFICE O/P EST SF 10 MIN: CPT | Performed by: PODIATRIST

## 2021-12-14 NOTE — PROGRESS NOTES
"12/14/2021  Foot and Ankle Surgery - Established Patient/Follow-up  Provider: Dr. Matthew Richards DPM  Location: [facility]    Subjective:  Johnson Fernandez is a 41 y.o. male.     Chief Complaint   Patient presents with   • Right Foot - Follow-up, Pain     Patient states over the last 2-4 days the pain has increased and it feels like he is stepping on glass   • Follow-up     last pcp appt 11/1/2020 Dr. Martin       HPI: Patient returns approximately 6 weeks after laser ablation of plantar lesion involving the right foot.  He has noticed improvement but continues to have callus region.  No new issues or concerns.    No Known Allergies    Current Outpatient Medications on File Prior to Visit   Medication Sig Dispense Refill   • amphetamine-dextroamphetamine XR (ADDERALL XR) 25 MG 24 hr capsule Take 1 capsule by mouth Daily 30 capsule 0   • Cariprazine HCl (Vraylar) 1.5 MG capsule capsule Take 1 capsule by mouth Daily. (Patient taking differently: Take 1.5 mg by mouth Daily. No longer taking) 90 capsule 0   • Desvenlafaxine Succinate ER 25 MG tablet sustained-release 24 hour Take 1 Tablet(s) 1 time a day 30 tablet 1   • HYDROcodone-acetaminophen (NORCO) 7.5-325 MG per tablet Take 1 tablet by mouth Every 6 (Six) Hours As Needed for Moderate Pain  (Pain). 28 tablet 0   • sertraline (ZOLOFT) 50 MG tablet Take 1 tablet by mouth Daily. (Patient taking differently: Take 50 mg by mouth Daily. No longer taking) 30 tablet 0     No current facility-administered medications on file prior to visit.       Objective   /84   Pulse 105   Ht 170.2 cm (67\")   Wt 79.8 kg (176 lb)   BMI 27.57 kg/m²     General:   Appearance: appears stated age and healthy    Orientation: AAOx3    Affect: appropriate    Gait: unimpaired       VASCULAR       Right Foot Vascularity   Normal vascular exam    Dorsalis pedis:  2+  Posterior tibial:  2+  Skin Temperature: warm    Edema Grading:  None  CFT:  < 3 seconds  Pedal Hair " Growth:  Present  Varicosities: none        NEUROLOGIC      Right Foot Neurologic   Light touch sensation:  Normal  Hot/Cold sensation: normal    Achilles reflex:  2+      MUSCULOSKELETAL       Right Foot Musculoskeletal   Ecchymosis:  None  Tenderness: right foot callus    Arch:  Normal      MUSCLE STRENGTH      Right Foot Muscle Strength   Normal strength    Foot dorsiflexion:  5  Foot plantar flexion:  5  Foot inversion:  5  Foot eversion:  5      DERMATOLOGIC      Right Foot Dermatologic   Skin: Hyperkeratotic tissue noted to the plantar medial aspect of the heel.  After removal, wound continues to improve.  No obvious residual signs of verruca    Assessment/Plan   Diagnoses and all orders for this visit:    1. Plantar wart, right foot (Primary)      Patient is doing quite well.  The lesion continues to improve.  I have asked that he moisturize with Aquaphor on a daily basis.  I would like him to resume baseline activity as tolerated.  I have no further restrictions for him.  Patient is to monitor and treat with pumice stone as needed.  I would like to see him in 6 weeks for reevaluation.    No orders of the defined types were placed in this encounter.         Note is dictated utilizing voice recognition software. Unfortunately this leads to occasional typographical errors. I apologize in advance if the situation occurs. If questions occur please do not hesitate to call our office.

## 2022-03-14 ENCOUNTER — OFFICE VISIT (OUTPATIENT)
Dept: FAMILY MEDICINE CLINIC | Facility: CLINIC | Age: 42
End: 2022-03-14

## 2022-03-14 VITALS
WEIGHT: 170.4 LBS | HEART RATE: 72 BPM | BODY MASS INDEX: 26.74 KG/M2 | DIASTOLIC BLOOD PRESSURE: 62 MMHG | TEMPERATURE: 97.5 F | SYSTOLIC BLOOD PRESSURE: 100 MMHG | HEIGHT: 67 IN | OXYGEN SATURATION: 99 %

## 2022-03-14 DIAGNOSIS — Z82.49 FAMILY HISTORY OF PREMATURE CAD: ICD-10-CM

## 2022-03-14 DIAGNOSIS — Z12.5 ENCOUNTER FOR PROSTATE CANCER SCREENING: ICD-10-CM

## 2022-03-14 DIAGNOSIS — Z72.51 HIGH RISK SEXUAL BEHAVIOR, UNSPECIFIED TYPE: ICD-10-CM

## 2022-03-14 DIAGNOSIS — R94.31 ABNORMAL EKG: ICD-10-CM

## 2022-03-14 DIAGNOSIS — Z00.00 WELLNESS EXAMINATION: Primary | ICD-10-CM

## 2022-03-14 PROBLEM — F98.8 ATTENTION DEFICIT DISORDER (ADD) WITHOUT HYPERACTIVITY: Status: ACTIVE | Noted: 2022-03-14

## 2022-03-14 PROCEDURE — 99213 OFFICE O/P EST LOW 20 MIN: CPT | Performed by: FAMILY MEDICINE

## 2022-03-14 PROCEDURE — 93000 ELECTROCARDIOGRAM COMPLETE: CPT | Performed by: FAMILY MEDICINE

## 2022-03-14 PROCEDURE — 99396 PREV VISIT EST AGE 40-64: CPT | Performed by: FAMILY MEDICINE

## 2022-03-14 NOTE — PROGRESS NOTES
"Chief Complaint  Anxiety (NEW PATIENT GET ESTABLISHED NEEDS PCP WANTS FULL PANEL LABS HAS A FEW ISSUES WANTS PHYSICAL EXAM), ADHD (HAS CLINICAL PSYCHOLOGIST), and Depression     New patient visit to me  Here to get established AND NEEDS ANNUAL WELLNESS EXAM    Previous PCP--[facility] provider/Dr. Martin, last visit October 2020 for anxiety/depression.  No changes with Zoloft.  Now seeing psychiatrist/Dr. Byers who manages his ADD med, SAYDA, and depression meds.    Overall, doing okay.  However, he does request to be screened for STDs today.  Complains of some increased burning with urination, mild x3 days.  In a new relationship recently.  No discharge.  No lesions noted.  No prior history of STDs.    Routine health maintenance/screening test:  Prostate Screening --- request screening  Colorectal Screen --- nonapplicable  Vaccines --- all vaccines up-to-date  Smoking/ETOH Status --- non-smoker, occasional THC use.  Social alcohol  Dentist, Eye Exam, Derm --- maintains regular dental visits, optometry visit.  No dermatologist  Diet/Exercise --- maintains a healthy well-balanced diet and regular cardio exercise  Pertinent FH --- significant for premature CAD/father with major MI at age 42, negative for colon cancer, prostate cancer    Review of Systems   Constitutional: Negative for fever and unexpected weight change.   Respiratory: Negative for cough and shortness of breath.    Cardiovascular: Negative for chest pain.        Genesis Fernandez presents to [facility]    Objective   Vital Signs:   Vitals:    03/14/22 0903   BP: 100/62   Pulse: 72   Temp: 97.5 °F (36.4 °C)   SpO2: 99%   Weight: 77.3 kg (170 lb 6.4 oz)   Height: 170.2 cm (67\")      Physical Exam  Vitals and nursing note reviewed.   Constitutional:       Appearance: Normal appearance. He is well-developed and normal weight.   HENT:      Head: Normocephalic and atraumatic.      Nose: Nose normal.   Eyes:      " Conjunctiva/sclera: Conjunctivae normal.      Pupils: Pupils are equal, round, and reactive to light.   Neck:      Thyroid: No thyromegaly.   Cardiovascular:      Rate and Rhythm: Normal rate and regular rhythm.      Heart sounds: Normal heart sounds. No murmur heard.  Pulmonary:      Effort: Pulmonary effort is normal.      Breath sounds: Normal breath sounds.   Abdominal:      General: Abdomen is flat. Bowel sounds are normal. There is no distension.      Palpations: Abdomen is soft. There is no hepatomegaly, splenomegaly or mass.      Tenderness: There is no abdominal tenderness. There is no guarding or rebound.      Hernia: No hernia is present.   Musculoskeletal:         General: Normal range of motion.      Cervical back: Normal range of motion and neck supple.      Right lower leg: No edema.      Left lower leg: No edema.   Lymphadenopathy:      Cervical: No cervical adenopathy.   Skin:     General: Skin is warm.      Comments: No dysplastic or abnormal lesions   Neurological:      General: No focal deficit present.      Mental Status: He is alert.   Psychiatric:         Mood and Affect: Mood normal.         Behavior: Behavior normal.         Thought Content: Thought content normal.         Judgment: Judgment normal.        Result Review :{Labs  Result Review  Imaging  Med Tab  Media  Procedures :23}     Common labs    Common Labsle 11/2/21 11/2/21    1127 1127   Glucose  96   BUN  11   Creatinine  1.00   eGFR Non  Am  83   eGFR African Am  100   Sodium  136   Potassium  4.2   Chloride  102   Calcium  9.1   WBC 6.97    Hemoglobin 14.9    Hematocrit 47.7    Platelets 226                          No results found for: ANADIRECT, FERRITIN, TESTOSTEROTT, [id number], FOLATE, RF, BNP       ECG 12 Lead    Date/Time: 3/14/2022 9:01 PM  Performed by: Whitney Henley MD  Authorized by: Whitney Henley MD   Comparison: compared with previous ECG from 10/28/2021  Comparison to previous ECG: Concerning for  new/for slightly inverted T waves in aVF  Rhythm: sinus rhythm  Rate: normal  Conduction: conduction normal  ST Segments: ST segments normal  T inversion: aVF  T flattening: aVF  QRS axis: normal  Other: no other findings  Other findings: T wave abnormality and early repolarization    Clinical impression: abnormal EKG              Assessment and Plan    Diagnoses and all orders for this visit:    1. Wellness examination (Primary)  -within normal limits  Needed screening includes: Lipid profile, PSA, and cardiac screening.  See below orders.  Otherwise, continue with healthy lifestyle --Needs low-carb/low calorie/low cholesterol diet and increase cardio exercise to greater than 150 minutes weekly  -     Lipid Panel With LDL / HDL Ratio  -     Hepatic Function Panel    2. Encounter for prostate cancer screening  -     PSA Screen    3. Family history of premature CAD  -significant FH of premature CAD/father at age 42  Abnormal EKG, new findings and aVF with flattened/inverted T waves compared to EKG from October 2021?  Asymptomatic.  Suggest starting ASA 81 mg daily until cleared by cardiology.  Referral to cardiologist for cardiac stress testing  -     Ambulatory Referral to Cardiology    4. High risk sexual behavior, unspecified type  -with dysuria, new diagnosis  Check STD work-up.  -     Hepatitis Panel, Acute  -     Chlamydia trachomatis, Neisseria gonorrhoeae, PCR - Urine, Urine, Clean Catch  -     HIV-1/O/2 Ag/Ab w Reflex    5. Abnormal EKG  -new finding  Asymptomatic.  However, with new EKG finding of aVF (flattened/inverted T waves) compared to EKG in October 2021.  Given significant FH history of premature CAD/father and abnormal EKG finding, will refer to cardiology for further stress testing recommendations.  -     Ambulatory Referral to Cardiology        Follow Up   Return in about 1 year (around 3/14/2023) for Annual physical.  Patient was given instructions and counseling regarding his condition or for  health maintenance advice. Please see specific information pulled into the AVS if appropriate.

## 2022-03-14 NOTE — PATIENT INSTRUCTIONS
Referral to cardiologist for stress test    Recommend low fat/low calorie diet and exercise greater than 150 minutes of cardio per week.      Further recommendations to follow based on labs

## 2022-03-16 ENCOUNTER — TELEPHONE (OUTPATIENT)
Dept: FAMILY MEDICINE CLINIC | Facility: CLINIC | Age: 42
End: 2022-03-16

## 2022-03-16 DIAGNOSIS — A54.9 GONORRHEA IN MALE: Primary | ICD-10-CM

## 2022-03-16 LAB
ALBUMIN SERPL-MCNC: 4.6 G/DL (ref 4–5)
ALP SERPL-CCNC: 126 IU/L (ref 44–121)
ALT SERPL-CCNC: 30 IU/L (ref 0–44)
AST SERPL-CCNC: 20 IU/L (ref 0–40)
BILIRUB DIRECT SERPL-MCNC: 0.2 MG/DL (ref 0–0.4)
BILIRUB SERPL-MCNC: 0.7 MG/DL (ref 0–1.2)
C TRACH RRNA SPEC QL NAA+PROBE: NEGATIVE
CHOLEST SERPL-MCNC: 234 MG/DL (ref 100–199)
HAV IGM SERPL QL IA: NEGATIVE
HBV CORE IGM SERPL QL IA: NEGATIVE
HBV SURFACE AG SERPL QL IA: NEGATIVE
HCV AB S/CO SERPL IA: <0.1 S/CO RATIO (ref 0–0.9)
HDLC SERPL-MCNC: 70 MG/DL
HIV 1+2 AB+HIV1 P24 AG SERPL QL IA: NON REACTIVE
LDLC SERPL CALC-MCNC: 154 MG/DL (ref 0–99)
LDLC/HDLC SERPL: 2.2 RATIO (ref 0–3.6)
N GONORRHOEA RRNA SPEC QL NAA+PROBE: POSITIVE
PROT SERPL-MCNC: 7.3 G/DL (ref 6–8.5)
PSA SERPL-MCNC: 0.7 NG/ML (ref 0–4)
TRIGL SERPL-MCNC: 61 MG/DL (ref 0–149)
VLDLC SERPL CALC-MCNC: 10 MG/DL (ref 5–40)

## 2022-03-16 RX ORDER — AZITHROMYCIN 500 MG/1
500 TABLET, FILM COATED ORAL ONCE
Qty: 2 TABLET | Refills: 0 | Status: SHIPPED | OUTPATIENT
Start: 2022-03-16 | End: 2022-03-19

## 2022-03-16 RX ORDER — CIPROFLOXACIN 500 MG/1
500 TABLET, FILM COATED ORAL ONCE
Qty: 1 TABLET | Refills: 0 | Status: SHIPPED | OUTPATIENT
Start: 2022-03-16 | End: 2022-03-18

## 2022-03-16 NOTE — TELEPHONE ENCOUNTER
Caller: Johnson Fernandez    Relationship: Self    Best call back number: [phone]     What is the best time to reach you: ANY TIME     Who are you requesting to speak with (clinical staff, provider,  specific staff member): CLINICAL    What was the call regarding: PATIENT CALLED REGARDING HIS RECENT LAB RESULTS. HE WOULD LIKE TO SPEAK WITH DR. DUMONT ABOUT THE NEXT STEPS FROM HERE AND IF HE NEEDS TO GET ON A MEDICATION.  PLEASE ADVISE.     Do you require a callback: YES

## 2022-03-31 ENCOUNTER — TELEPHONE (OUTPATIENT)
Dept: FAMILY MEDICINE CLINIC | Facility: CLINIC | Age: 42
End: 2022-03-31

## 2022-03-31 DIAGNOSIS — A54.9 GONORRHEA IN MALE: ICD-10-CM

## 2022-03-31 DIAGNOSIS — A54.9 GONORRHEA IN MALE: Primary | ICD-10-CM

## 2022-03-31 NOTE — TELEPHONE ENCOUNTER
Pt called and stated that Dr. Henley wanted him to have repeat labs drawn.  I do not see any active labs in the chart.  Please advise.    Pt is requesting a call back to schedule if labs are placed.

## 2022-04-04 LAB
C TRACH RRNA SPEC QL NAA+PROBE: NEGATIVE
N GONORRHOEA RRNA SPEC QL NAA+PROBE: NEGATIVE

## 2022-05-03 ENCOUNTER — OFFICE VISIT (OUTPATIENT)
Dept: CARDIOLOGY | Facility: CLINIC | Age: 42
End: 2022-05-03

## 2022-05-03 VITALS
BODY MASS INDEX: 27.47 KG/M2 | WEIGHT: 175 LBS | HEART RATE: 75 BPM | DIASTOLIC BLOOD PRESSURE: 76 MMHG | SYSTOLIC BLOOD PRESSURE: 112 MMHG | OXYGEN SATURATION: 99 % | HEIGHT: 67 IN | RESPIRATION RATE: 16 BRPM

## 2022-05-03 DIAGNOSIS — Z82.49 FAMILY HISTORY OF EARLY CAD: Primary | ICD-10-CM

## 2022-05-03 PROCEDURE — 99203 OFFICE O/P NEW LOW 30 MIN: CPT | Performed by: INTERNAL MEDICINE

## 2022-05-03 PROCEDURE — 93000 ELECTROCARDIOGRAM COMPLETE: CPT | Performed by: INTERNAL MEDICINE

## 2022-05-03 NOTE — PROGRESS NOTES
"      CARDIOLOGY    Bharti De La Torre MD    ENCOUNTER DATE:  2022    Johnson Fernandez / 41 y.o. / male        CHIEF COMPLAINT / REASON FOR OFFICE VISIT     Abnormal ECG ( New Patient ref by Dr Whitney Henley, Family History or CAD )      HISTORY OF PRESENT ILLNESS       HPI    Johnson Fernandez is a 41 y.o. male     This is a gentleman without significant personal risk factors but his father had a heart attack at 38 and  of a heart attack at 42.  He had an EKG with his primary provider which was read as abnormal and he was sent here for further evaluation.  He is active and does not have any exertional symptoms.    REVIEW OF SYSTEMS     Review of Systems   Constitutional: Negative for chills, fever, weight gain and weight loss.   Cardiovascular: Negative for leg swelling.   Respiratory: Negative for cough, snoring and wheezing.    Hematologic/Lymphatic: Negative for bleeding problem. Does not bruise/bleed easily.   Skin: Negative for color change.   Musculoskeletal: Negative for falls, joint pain and myalgias.   Gastrointestinal: Negative for melena.   Genitourinary: Negative for hematuria.   Neurological: Negative for excessive daytime sleepiness.   Psychiatric/Behavioral: Negative for depression. The patient is not nervous/anxious.          VITAL SIGNS     Visit Vitals  /76 (BP Location: Right arm, Patient Position: Sitting, Cuff Size: Adult)   Pulse 75   Resp 16   Ht 170.2 cm (67\")   Wt 79.4 kg (175 lb)   SpO2 99%   BMI 27.41 kg/m²         Wt Readings from Last 3 Encounters:   22 79.4 kg (175 lb)   22 77.3 kg (170 lb 6.4 oz)   21 79.8 kg (176 lb)     Body mass index is 27.41 kg/m².      PHYSICAL EXAMINATION     Constitutional:       General: Not in acute distress.  Neck:      Vascular: No carotid bruit or JVD.   Pulmonary:      Effort: Pulmonary effort is normal.      Breath sounds: Normal breath sounds.   Cardiovascular:      Normal rate. Regular rhythm.      Murmurs: There is no " murmur.   Psychiatric:         Mood and Affect: Mood and affect normal.           REVIEWED DATA       ECG 12 Lead    Date/Time: 5/3/2022 1:50 PM  Performed by: Bharti De La Torre MD  Authorized by: Bharti De La Torre MD   Comparison: compared with previous ECG from 3/13/2022  Rhythm: sinus rhythm  BPM: 75  Conduction: conduction normal  ST Segments: ST segments normal  T Waves: T waves normal    Clinical impression: normal ECG              Lipid Panel    Lipid Panel 3/14/22   Total Cholesterol 234 (A)   Triglycerides 61   HDL Cholesterol 70   VLDL Cholesterol 10   LDL Cholesterol  154 (A)   LDL/HDL Ratio 2.2   (A) Abnormal value       Comments are available for some flowsheets but are not being displayed.             Lab Results   Component Value Date    GLUCOSE 96 11/02/2021    BUN 11 11/02/2021    CREATININE 1.00 11/02/2021    EGFRIFNONA 83 11/02/2021    EGFRIFAFRI 100 11/02/2021    BCR 11.0 11/02/2021    K 4.2 11/02/2021    CO2 27.7 11/02/2021    CALCIUM 9.1 11/02/2021    PROTENTOTREF 7.3 03/14/2022    ALBUMIN 4.6 03/14/2022    LABIL2 1.9 10/15/2020    AST 20 03/14/2022    ALT 30 03/14/2022       ASSESSMENT & PLAN      Diagnosis Plan   1. Family history of early CAD  CT Cardiac Calcium Score Without Dye     He has a family history of coronary disease which is pretty significant with his father.  I have reviewed his labs and his cholesterol and blood pressure all look okay.  He is a little borderline with his LDL.  I recommend that we get a calcium score.  If his calcium score is elevated then he might benefit from statin therapy or at least more close watching of his LDL cholesterol.  If his calcium score is 0, I would then just recommend exercise and a heart healthy diet.  I answered all of his questions.      Orders Placed This Encounter   Procedures   • CT Cardiac Calcium Score Without Dye     Standing Status:   Future     Standing Expiration Date:   5/3/2023     Order Specific Question:   Release to patient      Answer:   Immediate   • ECG 12 Lead     This order was created via procedure documentation     Order Specific Question:   Release to patient     Answer:   Immediate           MEDICATIONS         Discharge Medications          Accurate as of May 3, 2022  1:51 PM. If you have any questions, ask your nurse or doctor.            Changes to Medications      Instructions Start Date   sertraline 50 MG tablet  Commonly known as: ZOLOFT  What changed: additional instructions   50 mg, Oral, Daily         Continue These Medications      Instructions Start Date   amphetamine-dextroamphetamine XR 25 MG 24 hr capsule  Commonly known as: Adderall XR   25 mg, Oral, Daily      Vraylar 1.5 MG capsule capsule  Generic drug: Cariprazine HCl   1.5 mg, Oral, Daily               Bharti De La Torre MD  05/03/22  13:51 EDT    **Neelam Disclaimer:   Much of this encounter note is an electronic transcription/translation of spoken language to printed text. The electronic translation of spoken language may permit erroneous, or at times, nonsensical words or phrases to be inadvertently transcribed. Although I have reviewed the note for such errors, some may still exist.

## 2022-06-11 ENCOUNTER — APPOINTMENT (OUTPATIENT)
Dept: CT IMAGING | Facility: HOSPITAL | Age: 42
End: 2022-06-11

## 2022-06-16 ENCOUNTER — HOSPITAL ENCOUNTER (OUTPATIENT)
Dept: CT IMAGING | Facility: HOSPITAL | Age: 42
Discharge: HOME | End: 2022-06-16
Admitting: INTERNAL MEDICINE

## 2022-06-16 ENCOUNTER — TELEPHONE (OUTPATIENT)
Dept: CARDIOLOGY | Facility: CLINIC | Age: 42
End: 2022-06-16

## 2022-06-16 DIAGNOSIS — Z82.49 FAMILY HISTORY OF EARLY CAD: ICD-10-CM

## 2022-06-16 PROCEDURE — 75571 CT HRT W/O DYE W/CA TEST: CPT

## 2022-06-16 NOTE — TELEPHONE ENCOUNTER
His calcium score was almost 0.  I do not recommend any new medications.  I recommend heart healthy diet, regular exercise, avoiding cigarettes.  No need to come back to see me unless his symptoms change.

## 2022-06-16 NOTE — TELEPHONE ENCOUNTER
Notified patient of results/recommendations. Patient verbalized understanding.    January Todd RN  Triage [facility]

## 2023-05-01 ENCOUNTER — OFFICE VISIT (OUTPATIENT)
Dept: FAMILY MEDICINE CLINIC | Facility: CLINIC | Age: 43
End: 2023-05-01
Payer: COMMERCIAL

## 2023-05-01 VITALS
HEIGHT: 67 IN | WEIGHT: 174.8 LBS | HEART RATE: 90 BPM | OXYGEN SATURATION: 100 % | TEMPERATURE: 97.8 F | SYSTOLIC BLOOD PRESSURE: 110 MMHG | DIASTOLIC BLOOD PRESSURE: 82 MMHG | BODY MASS INDEX: 27.44 KG/M2

## 2023-05-01 DIAGNOSIS — Z00.00 WELLNESS EXAMINATION: Primary | ICD-10-CM

## 2023-05-01 DIAGNOSIS — Z72.51 HIGH RISK SEXUAL BEHAVIOR, UNSPECIFIED TYPE: ICD-10-CM

## 2023-05-01 DIAGNOSIS — E78.2 MIXED HYPERLIPIDEMIA: ICD-10-CM

## 2023-05-01 DIAGNOSIS — Z82.49 FAMILY HISTORY OF PREMATURE CAD: ICD-10-CM

## 2023-05-01 RX ORDER — DEXTROAMPHETAMINE SACCHARATE, AMPHETAMINE ASPARTATE, DEXTROAMPHETAMINE SULFATE AND AMPHETAMINE SULFATE 3.75; 3.75; 3.75; 3.75 MG/1; MG/1; MG/1; MG/1
15 TABLET ORAL DAILY
COMMUNITY

## 2023-05-01 NOTE — PROGRESS NOTES
Chief Complaint  Annual Exam (Yearly wellness patient is fasting ) and Hyperlipidemia (Requesting to be checked for STD)    NEEDS ANNUAL WELLNESS  And  1 year follow-up on hyperlipidemia, cardiology visit, and requesting to be checked for STDs    LOV with me last March 2022 as a new patient to get established, wellness exam, and STD screening requested.  Multiple things done at last visit.  -- Due to family history of premature CAD, hyperlipidemia, and nonspecific EKG finding a referral was made to cardiology for further recommendations.  -- Recommendations were to improve upon a low-cholesterol diet and increase cardio exercise and follow-up in 3 months to have his lipids rechecked.  -- STD test done, found to have Neisseria.  Treated with Cipro and Zithromax according to recommendations.  Scheduled for test of cure 3 weeks later, negative.    Other interval history since last visit --he did see cardiologist/Dr. De La Torre in consultation last year May 2022, records reviewed.  Cardiac calcium score was ordered, result 2.7 therefore no further screening was ordered, no recommendations for lipid medication made.  Telephone encounter from cardiologist on 6/16/2022 reviewed.  Although he never did follow-up for repeat lipid testing.  He does maintain regular follow-up with his psychiatrist, no med changes made recently.    Overall, continues to do well.  Still working full-time at [employer] in [employer].  He states he really has been working harder during the last few months to improve upon a healthier diet and increase his cardio exercise.  Apparently he had gained some weight but has since lost about 20 pounds.  Tries to maintain a low-cholesterol, with a heart diet.    His only new complaint/concern is that he is requesting to be screened for STDs again.  Reports unprotected sex last week.  No symptoms, denies dysuria, urethral discharge, abdominal pain, nausea vomiting, or fever.    Routine health  "maintenance/screening test:  Prostate Screening ---  March 2022, negative  Colorectal Screen --- nonapplicable  Vaccines --- all vaccines up-to-date  Smoking/ETOH Status --- non-smoker, occasional THC use.  Social alcohol  Dentist, Eye Exam, Derm --- maintains regular dental visits, optometry visit.  No dermatologist  Diet/Exercise --- maintains a healthy well-balanced diet and regular cardio exercise  Pertinent FH --- significant for premature CAD/father with major MI at age 42 (seen cardiologist 2022, calcium score 2), negative for colon cancer, prostate cancer    Review of Systems   Constitutional: Negative for fever and unexpected weight change.   Respiratory: Negative for cough and shortness of breath.    Cardiovascular: Negative for chest pain.        Genesis Fernandez presents to [facility]    Objective   Vital Signs:   Vitals:    05/01/23 1347   BP: 110/82   BP Location: Right arm   Patient Position: Sitting   Cuff Size: Adult   Pulse: 90   Temp: 97.8 °F (36.6 °C)   SpO2: 100%   Weight: 79.3 kg (174 lb 12.8 oz)   Height: 170.2 cm (67\")      Body mass index is 27.38 kg/m².   Physical Exam  Vitals and nursing note reviewed.   Constitutional:       Appearance: Normal appearance. He is well-developed.   HENT:      Head: Normocephalic and atraumatic.      Nose: Nose normal.   Eyes:      Conjunctiva/sclera: Conjunctivae normal.      Pupils: Pupils are equal, round, and reactive to light.   Neck:      Thyroid: No thyromegaly.   Cardiovascular:      Rate and Rhythm: Normal rate and regular rhythm.      Heart sounds: Normal heart sounds. No murmur heard.  Pulmonary:      Effort: Pulmonary effort is normal.      Breath sounds: Normal breath sounds.   Abdominal:      General: Abdomen is flat. Bowel sounds are normal. There is no distension.      Palpations: Abdomen is soft. There is no hepatomegaly, splenomegaly or mass.      Tenderness: There is no abdominal tenderness. " There is no guarding or rebound.      Hernia: No hernia is present.   Musculoskeletal:         General: Normal range of motion.      Cervical back: Normal range of motion and neck supple.      Right lower leg: No edema.      Left lower leg: No edema.   Lymphadenopathy:      Cervical: No cervical adenopathy.   Skin:     General: Skin is warm.      Comments: No dysplastic or abnormal lesions   Neurological:      General: No focal deficit present.      Mental Status: He is alert.   Psychiatric:         Mood and Affect: Mood normal.         Behavior: Behavior normal.         Thought Content: Thought content normal.         Judgment: Judgment normal.        Result Review :                   No results found for: ANADIRECT, FERRITIN, TESTOSTEROTT, [id number], FOLATE, RF, BNP     March 2022 --CBC, CMP, PSA, all WNL,     CT Cardiac Calcium Score Without Dye (06/16/2022 11:46)             Assessment and Plan    Diagnoses and all orders for this visit:    1. Wellness examination (Primary)  -within normal limits  Needed screening today includes: CBC, CMP, lipids, and STD testing.  Continue to improve upon healthy lifestyle --Needs low-carb/low calorie/low cholesterol diet and increase cardio exercise to greater than 150 minutes weekly  -     CBC & Differential  -     Comprehensive Metabolic Panel  -     Lipid Panel With LDL / HDL Ratio    2. Family history of premature CAD  -father/CAD/40s  S/P cardiology visit in 2022, cardiac calcium score of 2.7, no further work-up or treatment needed.  However, recommendations are to continue with a healthy diet and regular cardio exercise.    3. Mixed hyperlipidemia  -uncontrolled  Hopefully improved since making some improvements with a healthier diet and exercise during recent months.  Plan to recheck lipids today  Ideally goal LDL needs to be less than 100, preferably at least less than 130 given 1 risk factor/family history.  Otherwise, very low risk patient (2022 cardiac calcium  score of 2.7)  May need to possibly consider low-dose statin or Zetia if LDL remains uncontrolled  Needs low-carb/low calorie/low cholesterol diet and increase cardio exercise to greater than 150 minutes weekly  -     Lipid Panel With LDL / HDL Ratio    4. High risk sexual behavior, unspecified type  -uncontrolled  Unprotected intercourse last week.  Asymptomatic.  STD last year/Neisseria, S/P treatment  Repeat STD screening today.  Counseled and educated regarding high risk behaviors, STD protection.  -     Hepatitis panel, acute  -     HIV-1 / O / 2 Ag / Antibody 4th Generation  -     Chlamydia trachomatis, Neisseria gonorrhoeae, PCR - Urine, Urine, Random Void    In addition to wellness exam today, seen and treated for separate and identifiable uncontrolled hyperlipidemia and high risk sexual behaviors/STD testing.    If all labs WNL and continues to do well then may follow-up in 1 year        Follow Up   Return in about 1 year (around 5/1/2024) for Annual physical, may follow-up in 1 year if all labs WNL.  Patient was given instructions and counseling regarding his condition or for health maintenance advice. Please see specific information pulled into the AVS if appropriate.

## 2023-05-01 NOTE — PATIENT INSTRUCTIONS
Continue with healthy lifestyle -- Recommend low fat/low calorie diet and exercise greater than 150 minutes of cardio per week.      Further recommendations to follow based on labs

## 2023-05-02 LAB
ALBUMIN SERPL-MCNC: 5.2 G/DL (ref 4–5)
ALBUMIN/GLOB SERPL: 2 {RATIO} (ref 1.2–2.2)
ALP SERPL-CCNC: 100 IU/L (ref 44–121)
ALT SERPL-CCNC: 31 IU/L (ref 0–44)
AST SERPL-CCNC: 20 IU/L (ref 0–40)
BASOPHILS # BLD AUTO: 0 X10E3/UL (ref 0–0.2)
BASOPHILS NFR BLD AUTO: 0 %
BILIRUB SERPL-MCNC: 0.9 MG/DL (ref 0–1.2)
BUN SERPL-MCNC: 9 MG/DL (ref 6–24)
BUN/CREAT SERPL: 8 (ref 9–20)
CALCIUM SERPL-MCNC: 9.8 MG/DL (ref 8.7–10.2)
CHLORIDE SERPL-SCNC: 103 MMOL/L (ref 96–106)
CHOLEST SERPL-MCNC: 228 MG/DL (ref 100–199)
CO2 SERPL-SCNC: 21 MMOL/L (ref 20–29)
CREAT SERPL-MCNC: 1.09 MG/DL (ref 0.76–1.27)
EGFRCR SERPLBLD CKD-EPI 2021: 87 ML/MIN/1.73
EOSINOPHIL # BLD AUTO: 0.1 X10E3/UL (ref 0–0.4)
EOSINOPHIL NFR BLD AUTO: 1 %
ERYTHROCYTE [DISTWIDTH] IN BLOOD BY AUTOMATED COUNT: 12.9 % (ref 11.6–15.4)
GLOBULIN SER CALC-MCNC: 2.6 G/DL (ref 1.5–4.5)
GLUCOSE SERPL-MCNC: 102 MG/DL (ref 70–99)
HAV IGM SERPL QL IA: NEGATIVE
HBV CORE IGM SERPL QL IA: NEGATIVE
HBV SURFACE AG SERPL QL IA: NEGATIVE
HCT VFR BLD AUTO: 47.7 % (ref 37.5–51)
HCV AB SERPL QL IA: NORMAL
HCV IGG SERPL QL IA: NON REACTIVE
HDLC SERPL-MCNC: 63 MG/DL
HGB BLD-MCNC: 15.9 G/DL (ref 13–17.7)
HIV 1+2 AB+HIV1 P24 AG SERPL QL IA: NON REACTIVE
IMM GRANULOCYTES # BLD AUTO: 0 X10E3/UL (ref 0–0.1)
IMM GRANULOCYTES NFR BLD AUTO: 0 %
LDLC SERPL CALC-MCNC: 146 MG/DL (ref 0–99)
LDLC/HDLC SERPL: 2.3 RATIO (ref 0–3.6)
LYMPHOCYTES # BLD AUTO: 1.9 X10E3/UL (ref 0.7–3.1)
LYMPHOCYTES NFR BLD AUTO: 18 %
MCH RBC QN AUTO: 28.5 PG (ref 26.6–33)
MCHC RBC AUTO-ENTMCNC: 33.3 G/DL (ref 31.5–35.7)
MCV RBC AUTO: 86 FL (ref 79–97)
MONOCYTES # BLD AUTO: 0.7 X10E3/UL (ref 0.1–0.9)
MONOCYTES NFR BLD AUTO: 6 %
NEUTROPHILS # BLD AUTO: 7.7 X10E3/UL (ref 1.4–7)
NEUTROPHILS NFR BLD AUTO: 75 %
PLATELET # BLD AUTO: 256 X10E3/UL (ref 150–450)
POTASSIUM SERPL-SCNC: 4.5 MMOL/L (ref 3.5–5.2)
PROT SERPL-MCNC: 7.8 G/DL (ref 6–8.5)
RBC # BLD AUTO: 5.58 X10E6/UL (ref 4.14–5.8)
SODIUM SERPL-SCNC: 141 MMOL/L (ref 134–144)
TRIGL SERPL-MCNC: 108 MG/DL (ref 0–149)
VLDLC SERPL CALC-MCNC: 19 MG/DL (ref 5–40)
WBC # BLD AUTO: 10.5 X10E3/UL (ref 3.4–10.8)

## 2023-05-03 LAB
C TRACH RRNA SPEC QL NAA+PROBE: NEGATIVE
N GONORRHOEA RRNA SPEC QL NAA+PROBE: NEGATIVE

## 2023-11-29 ENCOUNTER — OFFICE VISIT (OUTPATIENT)
Dept: FAMILY MEDICINE CLINIC | Facility: CLINIC | Age: 43
End: 2023-11-29
Payer: COMMERCIAL

## 2023-11-29 VITALS
HEART RATE: 78 BPM | TEMPERATURE: 97.5 F | OXYGEN SATURATION: 98 % | HEIGHT: 67 IN | BODY MASS INDEX: 28.56 KG/M2 | DIASTOLIC BLOOD PRESSURE: 70 MMHG | SYSTOLIC BLOOD PRESSURE: 118 MMHG | WEIGHT: 182 LBS

## 2023-11-29 DIAGNOSIS — Z72.53 HIGH RISK BISEXUAL BEHAVIOR: Primary | ICD-10-CM

## 2023-11-29 DIAGNOSIS — E78.2 MIXED HYPERLIPIDEMIA: ICD-10-CM

## 2023-11-29 NOTE — PROGRESS NOTES
"Chief Complaint  STD PANEL (REQUESTING STD PANEL)    Acute care visit made for request for STD check    Here for STD check.  Unprotected intercourse about 2 weeks ago, bisexual.  Had some mild dysuria only briefly x 1 to 2 days, then resolved.  No further symptoms.  No urethral discharge, back pain, fever, joint aches, increased urinary frequency.  No known definitive exposure, just wants to be checked.    Still sees psychiatrist, no change with medications.  Has not really improved upon a low-cholesterol diet or increased his cardio exercise in recent months.  Weight gain about 8 pounds noted.    Last visit about 6 months ago for wellness exam, also requested STD check which was negative.  Discussed possibly treating his lipids but wanted a chance to improve upon a low-cholesterol diet.  Significant family history for CAD but cardiac calcium score testing only 2.7.      Review of Systems   Constitutional:  Negative for fever and unexpected weight change.   Respiratory:  Negative for cough and shortness of breath.    Cardiovascular:  Negative for chest pain.        Genesis Fernandez presents to [facility]    Objective   Vital Signs:   Vitals:    11/29/23 1048   BP: 118/70   BP Location: Right arm   Patient Position: Sitting   Cuff Size: Adult   Pulse: 78   Temp: 97.5 °F (36.4 °C)   SpO2: 98%   Weight: 82.6 kg (182 lb)   Height: 170.2 cm (67\")      Body mass index is 28.51 kg/m².   Physical Exam  Vitals and nursing note reviewed.   Constitutional:       Appearance: Normal appearance. He is well-developed.   HENT:      Head: Normocephalic and atraumatic.      Nose: Nose normal.   Neck:      Thyroid: No thyromegaly.   Cardiovascular:      Rate and Rhythm: Normal rate and regular rhythm.      Heart sounds: Normal heart sounds.   Pulmonary:      Effort: Pulmonary effort is normal.      Breath sounds: Normal breath sounds.   Abdominal:      General: Abdomen is flat. Bowel " "sounds are normal. There is no distension.      Palpations: Abdomen is soft.      Tenderness: There is no abdominal tenderness. There is no right CVA tenderness, left CVA tenderness or guarding.   Musculoskeletal:      Cervical back: Normal range of motion and neck supple.      Right lower leg: No edema.      Left lower leg: No edema.   Lymphadenopathy:      Cervical: No cervical adenopathy.   Neurological:      Mental Status: He is alert.        Result Review :     Common labs          5/1/2023    14:28   Common Labs   Glucose 102    BUN 9    Creatinine 1.09    Sodium 141    Potassium 4.5    Chloride 103    Calcium 9.8    Total Protein 7.8    Albumin 5.2    Total Bilirubin 0.9    Alkaline Phosphatase 100    AST (SGOT) 20    ALT (SGPT) 31    WBC 10.5    Hemoglobin 15.9    Hematocrit 47.7    Platelets 256    Total Cholesterol 228    Triglycerides 108    HDL Cholesterol 63    LDL Cholesterol  146          No results found for: \"ANADIRECT\", \"FERRITIN\", \"TESTOSTEROTT\", \"PYVI16XV\", \"FOLATE\", \"RF\", \"BNP\"       May 2023 --STD workup negative         Assessment and Plan    Diagnoses and all orders for this visit:    1. High risk bisexual behavior (Primary) --recent unprotected intercourse, desires screening  Asymptomatic.  However desires screening.  Counseling done.  Discussed how results would be reported etc  -     Chlamydia trachomatis, Neisseria gonorrhoeae, Trichomonas vaginalis, PCR - Swab, Urine, Clean Catch  -     HIV-1 / O / 2 Ag / Antibody 4th Generation  -     Hepatitis panel, acute    2. Mixed hyperlipidemia --remains uncontrolled  Again stressed the importance to improve upon a low-cholesterol diet and increase his cardio exercise.  Plan to recheck lipids in 4 to 6 months, if LDL remains greater than 130 do recommend treatment given significant family history.        Follow Up   Return in about 6 months (around 5/29/2024), or if symptoms worsen or fail to improve, for Annual physical.  Patient was given " instructions and counseling regarding his condition or for health maintenance advice. Please see specific information pulled into the AVS if appropriate.

## 2023-12-01 LAB
C TRACH RRNA SPEC QL NAA+PROBE: NEGATIVE
HAV IGM SERPL QL IA: NEGATIVE
HBV CORE IGM SERPL QL IA: NEGATIVE
HBV SURFACE AG SERPL QL IA: NEGATIVE
HCV AB SERPL QL IA: NORMAL
HCV IGG SERPL QL IA: NON REACTIVE
HIV 1+2 AB+HIV1 P24 AG SERPL QL IA: NON REACTIVE
N GONORRHOEA RRNA SPEC QL NAA+PROBE: NEGATIVE
T VAGINALIS RRNA SPEC QL NAA+PROBE: NEGATIVE

## 2024-05-31 ENCOUNTER — TELEPHONE (OUTPATIENT)
Dept: FAMILY MEDICINE CLINIC | Facility: CLINIC | Age: 44
End: 2024-05-31
Payer: COMMERCIAL

## 2024-05-31 NOTE — TELEPHONE ENCOUNTER
PATIENT STATES HE HAS SWITCHED MEDICAL [facility] TO A NEW PCP AND WILL NO LONGER NEED APPOINTMENTS WITH DR. DUMONT.

## (undated) DEVICE — BANDAGE,GAUZE,BULKEE II,4.5"X4.1YD,STRL: Brand: MEDLINE

## (undated) DEVICE — SOLUTION,WATER,IRRIGATION,1000ML,STERILE: Brand: MEDLINE

## (undated) DEVICE — KT SURG TURNOVER 050

## (undated) DEVICE — SHOE, POST-OPERATIVE: Brand: DEROYAL

## (undated) DEVICE — GLV SURG BIOGEL M LTX PF 7 1/2

## (undated) DEVICE — GAUZE,SPONGE,FLUFF,6"X6.75",STRL,5/TRAY: Brand: MEDLINE

## (undated) DEVICE — DRAPE SHEET ULTRAGARD: Brand: MEDLINE

## (undated) DEVICE — NDL HYPO PRECISIONGLIDE/REG 18G 1IN PNK

## (undated) DEVICE — INTENDED FOR TISSUE SEPARATION, AND OTHER PROCEDURES THAT REQUIRE A SHARP SURGICAL BLADE TO PUNCTURE OR CUT.: Brand: BARD-PARKER ® CARBON RIB-BACK BLADES

## (undated) DEVICE — TBG CONN EVAC MEGAVAC SMOKE SPLM

## (undated) DEVICE — WET SKIN PREP TRAY: Brand: MEDLINE INDUSTRIES, INC.

## (undated) DEVICE — BNDG ELAS ELITE V/CLOSE 4IN 5YD LF STRL

## (undated) DEVICE — UNDERGLV SURG BIOGEL INDICAT PI SZ8 BLU

## (undated) DEVICE — TOWEL,OR,DSP,ST,WHITE,DLX,4/PK,20PK/CS: Brand: MEDLINE